# Patient Record
Sex: FEMALE | Race: WHITE | Employment: STUDENT | URBAN - METROPOLITAN AREA
[De-identification: names, ages, dates, MRNs, and addresses within clinical notes are randomized per-mention and may not be internally consistent; named-entity substitution may affect disease eponyms.]

---

## 2017-01-12 ENCOUNTER — TRANSFERRED RECORDS (OUTPATIENT)
Dept: FAMILY MEDICINE | Facility: CLINIC | Age: 32
End: 2017-01-12

## 2017-01-25 DIAGNOSIS — Z30.41 ENCOUNTER FOR SURVEILLANCE OF CONTRACEPTIVE PILLS: Primary | ICD-10-CM

## 2017-01-25 RX ORDER — DROSPIRENONE AND ETHINYL ESTRADIOL 0.03MG-3MG
KIT ORAL
Qty: 28 TABLET | Refills: 0 | Status: SHIPPED | OUTPATIENT
Start: 2017-01-25 | End: 2017-02-08

## 2017-01-25 NOTE — TELEPHONE ENCOUNTER
Pending Prescriptions:                       Disp   Refills    VANESSA 3-0.03 MG per tablet [Pharmacy Med *28 tab*0            Sig: TAKE 1 TABLET BY MOUTH DAILY    Do you want to refill for 30?   Pt does have a px scheduled to see you 2-8-2017  Please fax, or siny.  Arlette  [phone] (home)

## 2017-02-08 ENCOUNTER — OFFICE VISIT (OUTPATIENT)
Dept: FAMILY MEDICINE | Facility: CLINIC | Age: 32
End: 2017-02-08

## 2017-02-08 VITALS
BODY MASS INDEX: 33.37 KG/M2 | SYSTOLIC BLOOD PRESSURE: 116 MMHG | HEIGHT: 66 IN | RESPIRATION RATE: 16 BRPM | WEIGHT: 207.6 LBS | DIASTOLIC BLOOD PRESSURE: 82 MMHG | TEMPERATURE: 98.4 F | OXYGEN SATURATION: 98 % | HEART RATE: 90 BPM

## 2017-02-08 DIAGNOSIS — Z01.411 ENCOUNTER FOR GYNECOLOGICAL EXAMINATION WITH ABNORMAL FINDING: Primary | ICD-10-CM

## 2017-02-08 DIAGNOSIS — J45.990 EXERCISE INDUCED BRONCHOSPASM: ICD-10-CM

## 2017-02-08 DIAGNOSIS — J30.2 OTHER SEASONAL ALLERGIC RHINITIS: ICD-10-CM

## 2017-02-08 DIAGNOSIS — Z30.41 ENCOUNTER FOR SURVEILLANCE OF CONTRACEPTIVE PILLS: ICD-10-CM

## 2017-02-08 DIAGNOSIS — J45.20 ASTHMA, INTERMITTENT, UNCOMPLICATED: ICD-10-CM

## 2017-02-08 DIAGNOSIS — E66.09 OBESITY DUE TO EXCESS CALORIES, UNSPECIFIED OBESITY SEVERITY: ICD-10-CM

## 2017-02-08 LAB — HEMOGLOBIN: 14.1 G/DL (ref 11.7–15.7)

## 2017-02-08 PROCEDURE — 85018 HEMOGLOBIN: CPT | Performed by: FAMILY MEDICINE

## 2017-02-08 PROCEDURE — 36415 COLL VENOUS BLD VENIPUNCTURE: CPT | Performed by: FAMILY MEDICINE

## 2017-02-08 PROCEDURE — 99395 PREV VISIT EST AGE 18-39: CPT | Performed by: FAMILY MEDICINE

## 2017-02-08 RX ORDER — LEVALBUTEROL TARTRATE 45 UG/1
2 AEROSOL, METERED ORAL EVERY 4 HOURS PRN
Qty: 2 INHALER | Refills: 1 | Status: SHIPPED | OUTPATIENT
Start: 2017-02-08 | End: 2018-02-08

## 2017-02-08 RX ORDER — DROSPIRENONE AND ETHINYL ESTRADIOL 0.03MG-3MG
1 KIT ORAL DAILY
Qty: 84 TABLET | Refills: 3 | Status: SHIPPED | OUTPATIENT
Start: 2017-02-08 | End: 2018-01-27

## 2017-02-08 RX ORDER — MONTELUKAST SODIUM 10 MG/1
10 TABLET ORAL AT BEDTIME
Qty: 90 TABLET | Refills: 3 | Status: SHIPPED | OUTPATIENT
Start: 2017-02-08 | End: 2018-02-08

## 2017-02-08 RX ORDER — FLUTICASONE PROPIONATE 50 MCG
1-2 SPRAY, SUSPENSION (ML) NASAL DAILY
Qty: 16 G | Refills: 11 | Status: SHIPPED | OUTPATIENT
Start: 2017-02-08 | End: 2019-02-11

## 2017-02-08 RX ORDER — FEXOFENADINE HCL 180 MG/1
180 TABLET ORAL DAILY
Qty: 90 TABLET | Refills: 3 | Status: SHIPPED | OUTPATIENT
Start: 2017-02-08 | End: 2018-02-08

## 2017-02-08 NOTE — PATIENT INSTRUCTIONS
HGB 14.1 gm/dl    Total calcium intake of 1500 mgm/day, vitamin D 400-800IU/day and a regular weight bearing exercise program for prevention of osteoporosis is recommended treatment at this time.    Follow your asthma action plan  A low fat diet, regular aerobic exercise like walking 30 minutes daily and weight control is the treatment recommendations at this time     Sunscreen    Recheck 1 year.

## 2017-02-08 NOTE — NURSING NOTE
"Prudence Feliz is here for a CPX. Fasting: Yes.    Pre-visit Planning  Immunizations -up to date  Colonoscopy -NA  Mammogram -NA  Asthma test --is completed today  PHQ2 -is completed today  AVIVA 7 -NA    Vitals:  BP Cuff right  Arm with large Cuff  PULSE regular  207 lbs 9.6 oz and 5' 5.5\"  CLASSIFICATION OF OVERWEIGHT AND OBESITY BY BMI                        Obesity Class           BMI(kg/m2)  Underweight                                    < 18.5  Normal                                         18.5-24.9  Overweight                                     25.0-29.9  OBESITY                     I                  30.0-34.9                             II                 35.0-39.9  EXTREME OBESITY             III                >40      Patient's  BMI Body mass index is 34.01 kg/(m^2).  Http://hin.nhlbi.nih.gov/menuplanner/menu.cgi  Tobacco Use:  Questioned patient about current smoking habits.  Pt. has never smoked.  ETOH screening Questions:  1-How often do you have a drink containing alcohol?                             2 times per month(s)  2-How many drinks containing alcohol do you have on a typical day when you are         Drinking?                              3   3- How often do you have 5 or more drinks on one occasion?                              2 times per year    Have you ever:  @None of the patient's responses to the CAGE screening were positive / Negative CAGE score@    Roomed by: Ana Caceres CMA      "

## 2017-02-08 NOTE — PROGRESS NOTES
"SUBJECTIVE:  Prudence Feliz is an 31 year old G 0 P 0 woman who presents for   annual gyn exam. Patient's last menstrual period was 01/19/2017 (exact date). Periods are regular q 28-30 days, lasting   3 days. Dysmenorrhea:none. Cyclic symptoms   include none. No intermenstrual bleeding,   spotting, or discharge.    Current contraception: oral contraceptives  ANABELA exposure: no  History of abnormal Pap smear: No  Family history of uterine or ovarian cancer: No  Regular self breast exam: Yes  History of abnormal mammogram: No  Family history of breast cancer: No  History of abnormal lipids: No    Past Medical History   Diagnosis Date     Exercise induced bronchospasm        Family History   Problem Relation Age of Onset     Thyroid Disease Mother      C.A.D. No family hx of      Breast Cancer No family hx of      Cancer - colorectal No family hx of        Past Surgical History   Procedure Laterality Date     C nonspecific procedure  1994     urethroscopy       Current Outpatient Prescriptions   Medication     VANESSA 3-0.03 MG per tablet     fexofenadine (ALLEGRA) 180 MG tablet     montelukast (SINGULAIR) 10 MG tablet     fluticasone (FLONASE) 50 MCG/ACT nasal spray     XOPENEX HFA 45 MCG/ACT IN AERO     No current facility-administered medications for this visit.     Allergies   Allergen Reactions     Cefaclor      C-clor       Social History   Substance Use Topics     Smoking status: Never Smoker      Smokeless tobacco: Never Used     Alcohol Use: 0.5 oz/week     1 drink(s) per week      Comment: once a week       Review Of Systems  Ears/Nose/Throat: allergies year around  Respiratory: asthma well controlled  Cardiovascular: negative  Gastrointestinal: negative  Genitourinary: negative    OBJECTIVE:  /82 mmHg  Pulse 90  Temp(Src) 98.4  F (36.9  C) (Oral)  Ht 1.664 m (5' 5.5\")  Wt 94.167 kg (207 lb 9.6 oz)  BMI 34.01 kg/m2  SpO2 98%  LMP 01/19/2017 (Exact Date)  General appearance: healthy, alert, " no distress, cooperative and smiling  Skin: Skin color, texture, turgor normal. No rashes or lesions., positives: fair skin with genral distibution of benign nevi  Ears: negative  Nose/Sinuses: Nares normal. Septum midline. Mucosa normal. No drainage or sinus tenderness.  Oropharynx: Lips, mucosa, and tongue normal. Teeth and gums normal.  Neck: Neck supple. No adenopathy. Thyroid symmetric, normal size,, Carotids without bruits.  Lungs: negative, Percussion normal. Good diaphragmatic excursion. Lungs clear  Heart: negative, PMI normal. No lifts, heaves, or thrills. RRR. No murmurs, clicks gallops or rub  Breasts: Inspection negative. No nipple discharge or bleeding. No masses.  Abdomen: Abdomen soft, non-tender. BS normal. No masses, organomegaly, positive findings: obese  Pelvic: External genitalia and vagina normal. Bimanual and rectovaginal normal.  BMI : Body mass index is 34.01 kg/(m^2).    ASSESSMENT:(Z01.411) Encounter for gynecological examination with abnormal finding  (primary encounter diagnosis)  Plan: CL AFF HEMOGLOBIN (BFP), VENOUS COLLECTION        Total calcium intake of 1500 mgm/day, vitamin D 400-800IU/day and a regular weight bearing exercise program for prevention of osteoporosis is recommended treatment at this time.      (J45.20) Asthma, intermittent, uncomplicated  Comment: follow Asthma action plan  Plan: fexofenadine (ALLEGRA) 180 MG tablet,         montelukast (SINGULAIR) 10 MG tablet,         fluticasone (FLONASE) 50 MCG/ACT spray,         levalbuterol (XOPENEX HFA) 45 MCG/ACT Inhaler        refilled    (J30.2) Other seasonal allergic rhinitis  Plan: fexofenadine (ALLEGRA) 180 MG tablet,         montelukast (SINGULAIR) 10 MG tablet,         fluticasone (FLONASE) 50 MCG/ACT spray        Daily care    (J45.990) Exercise induced bronchospasm  Plan: montelukast (SINGULAIR) 10 MG tablet,         levalbuterol (XOPENEX HFA) 45 MCG/ACT Inhaler        I have reviewed the patient's medical history  in detail and updated the computerized patient record.      (E66.09) Obesity due to excess calories, unspecified obesity severity  Plan: A low fat diet, regular aerobic exercise like walking 30 minutes daily and weight control is the treatment recommendations at this time      (Z30.41) Encounter for surveillance of contraceptive pills  Plan: drospirenone-ethinyl estradiol (VANESSA) 3-0.03         MG per tablet        Refilled/ recheck 1 year        Dx:  1)  Pap smear  2)  Mammography, lipids at appropriate intervals      PE:  Reviewed health maintenance including diet, regular exercise   and periodic exams.

## 2017-02-08 NOTE — Clinical Note
My Asthma Action Plan  Name: Prudence Feliz   YOB: 1985  Date: 2/8/2017   My doctor: Lanny Gaines   My clinic: [facility]   [address]  [address]  [facility] [address] [phone] My Control Medicine: Allegra, Flonase and Singulair        Dose: daily  My Rescue Medicine: Xopenex        Dose: 1-2 puffs   My Asthma Severity: intermittent  Avoid your asthma triggers: upper respiratory infections, pollens and exercise or sports        GREEN ZONE   Good Control    I feel good    No cough or wheeze    Can work, sleep and play without asthma symptoms       Take your asthma control medicine every day.     1. If exercise triggers your asthma, take your rescue medication    15 minutes before exercise or sports, and    During exercise if you have asthma symptoms  2. Spacer to use with inhaler: If you have a spacer, make sure to use it with your inhaler             YELLOW ZONE Getting Worse  I have ANY of these:    I do not feel good    Cough or wheeze    Chest feels tight    Wake up at night   1. Keep taking your Green Zone medications  2. Start taking your rescue medicine:    every 20 minutes for up to 1 hour. Then every 4 hours for 24-48 hours.  3. If you stay in the Yellow Zone for more than 12-24 hours, contact your doctor.  4. If you do not return to the Green Zone in 12-24 hours or you get worse, start taking your oral steroid medicine if prescribed by your provider.           RED ZONE Medical Alert - Get Help  I have ANY of these:    I feel awful    Medicine is not helping    Breathing getting harder    Trouble walking or talking    Nose opens wide to breathe       1. Take your rescue medicine NOW  2. If your provider has prescribed an oral steroid medicine, start taking it NOW  3. Call your doctor NOW  4. If you are still in the Red Zone after 20 minutes and you have not reached your doctor:    Take your rescue medicine again and    Call  911 or go to the emergency room right away    See your regular doctor within 2 weeks of an Emergency Room or Urgent Care visit for follow-up treatment.        The above medication may be given at school or day care?: Yes and N/A (Adult Patient)  Child can carry and use inhaler(s) at school with approval of school nurse?: Yes and N/A (Adult Patient)    Electronically signed by: Lanny Gaines, February 8, 2017    Annual Reminders:  Meet with Asthma Educator,  Flu Shot in the Fall, consider Pneumonia Vaccination for patients with asthma (aged 19 and older).    Pharmacy:    [facility] [place]  [facility] [address] [facility] [address] AT [facility] [address]  [facility] [address] [facility] [address] AT [facility] [address] & [facility]                      Asthma Triggers  How To Control Things That Make Your Asthma Worse    Triggers are things that make your asthma worse.  Look at the list below to help you find your triggers and what you can do about them.  You can help prevent asthma flare-ups by staying away from your triggers.      Trigger                                                          What you can do   Cigarette Smoke  Tobacco smoke can make asthma worse. Do not allow smoking in your home, car or around you.  Be sure no one smokes at a child s day care or school.  If you smoke, ask your health care provider for ways to help you quick.  Ask family members to quit too.  Ask your health care provider for a referral to Quit plan to help you quit smoking, or call 0-842-867-PLAN.     Colds, Flu, Bronchitis  These are common triggers of asthma. Wash your hands often.  Don t touch your eyes, nose or mouth.  Get a flu shot every year.     Dust Mites  These are tiny bugs that live in cloth or carpet. They are too small to see. Wash sheets and blankets in hot water every week.   Encase pillows and mattress in dust mite proof covers.  Avoid having carpet if you  can. If you have carpet, vacuum weekly.   Use a dust mask and HEPA vacuum.   Pollen and Outdoor Mold  Some people are allergic to trees, grass, or weed pollen, or molds. Try to keep your windows closed.  Limit time out doors when pollen count is high.   Ask you health care provider about taking medicine during allergy season.     Animal Dander  Some people are allergic to skin flakes, urine or saliva from pets with fur or feathers. Keep pets with fur or feathers out of your home.    If you can t keep the pet outdoors, then keep the pet out of your bedroom.  Keep the bedroom door closed.  Keep pets off cloth furniture and away from stuffed toys.     Mice, Rats, and Cockroaches  Some people are allergic to the waste from these pests.   Cover food and garbage.  Clean up spills and food crumbs.  Store grease in the refrigerator.   Keep food out of the bedroom.   Indoor Mold  This can be a trigger if your home has high moisture Fix leaking faucets, pipes, or other sources of water.   Clean moldy surfaces.  Dehumidify basement if it is damp and smelly.   Smoke, Strong Odors, and Sprays  These can reduce air quality. Stay away from strong odors and sprays, such as perfume, powder, hair spray, paints, smoke incense, paint, cleaning products, candles and new carpet.   Exercise or Sports  Some people with asthma have this trigger. Be active!  Ask you doctor about taking medicine before sports or exercise to prevent symptoms.    Warm up for 5-10 minutes before and after sports or exercise.     Other Triggers of Asthma  Cold air:  Cover your nose and mouth with a scarf.  Sometimes laughing or crying can be a trigger.  Some medicines and food can trigger asthma.

## 2017-02-08 NOTE — MR AVS SNAPSHOT
After Visit Summary   2/8/2017    Prudence Feliz    MRN: [id number]           Patient Information     Date Of Birth          1985        Visit Information        Provider Department      2/8/2017 10:15 AM Lanny Gaines MD [facility]        Today's Diagnoses     Encounter for gynecological examination with abnormal finding    -  1     Asthma, intermittent, uncomplicated         Other seasonal allergic rhinitis         Exercise induced bronchospasm         Encounter for surveillance of contraceptive pills           Care Instructions    HGB 14.1 gm/dl    Total calcium intake of 1500 mgm/day, vitamin D 400-800IU/day and a regular weight bearing exercise program for prevention of osteoporosis is recommended treatment at this time.    Follow your asthma action plan  A low fat diet, regular aerobic exercise like walking 30 minutes daily and weight control is the treatment recommendations at this time     Sunscreen          Follow-ups after your visit        Who to contact     If you have questions or need follow up information about today's clinic visit or your schedule please contact [facility] directly at [phone].  Normal or non-critical lab and imaging results will be communicated to you by Faveshart, letter or phone within 4 business days after the clinic has received the results. If you do not hear from us within 7 days, please contact the clinic through [employer] or phone. If you have a critical or abnormal lab result, we will notify you by phone as soon as possible.  Submit refill requests through FiPath or call your pharmacy and they will forward the refill request to us. Please allow 3 business days for your refill to be completed.          Additional Information About Your Visit        Faveshart Information     FiPath gives you secure access to your electronic health record. If you see a primary care provider, you can also send  "messages to your care team and make appointments. If you have questions, please call your primary care clinic.  If you do not have a primary care provider, please call [phone] and they will assist you.        Care EveryWhere ID     This is your Care EveryWhere ID. This could be used by other organizations to access your [place] medical records  [id number]        Your Vitals Were     Pulse Temperature Respirations    90 98.4  F (36.9  C) (Oral) 16    Height BMI (Body Mass Index) Pulse Oximetry    1.664 m (5' 5.5\") 34.01 kg/m2 98%    Last Period          01/19/2017 (Exact Date)         Blood Pressure from Last 3 Encounters:   02/08/17 116/82   02/03/16 110/70   03/02/15 120/80    Weight from Last 3 Encounters:   02/08/17 94.167 kg (207 lb 9.6 oz)   02/03/16 93.895 kg (207 lb)   03/02/15 89.812 kg (198 lb)              We Performed the Following     Asthma Action Plan (AAP)     CL AFF HEMOGLOBIN (BFP)     VENOUS COLLECTION          Today's Medication Changes          These changes are accurate as of: 2/8/17 11:24 AM.  If you have any questions, ask your nurse or doctor.               These medicines have changed or have updated prescriptions.        Dose/Directions    drospirenone-ethinyl estradiol 3-0.03 MG per tablet   Commonly known as:  VANESSA   This may have changed:  See the new instructions.   Used for:  Encounter for surveillance of contraceptive pills   Changed by:  Lanny Gaines MD        Dose:  1 tablet   Take 1 tablet by mouth daily   Quantity:  84 tablet   Refills:  3       levalbuterol 45 MCG/ACT Inhaler   Commonly known as:  XOPENEX HFA   This may have changed:  See the new instructions.   Used for:  Exercise induced bronchospasm, Asthma, intermittent, uncomplicated   Changed by:  Lanny Gaines MD        Dose:  2 puff   Inhale 2 puffs into the lungs every 4 hours as needed for shortness of breath / dyspnea or wheezing   Quantity:  2 Inhaler   Refills:  1            Where to get your " medicines      These medications were sent to [id number] - [place], [place] - [address], [place] [address]    Hours:  24-hours Phone:  [phone]    - drospirenone-ethinyl estradiol 3-0.03 MG per tablet  - fexofenadine 180 MG tablet  - fluticasone 50 MCG/ACT spray  - levalbuterol 45 MCG/ACT Inhaler  - montelukast 10 MG tablet             Primary Care Provider Office Phone # Fax #    Lanny Gaines [phone]       [facility] [address] [facility]  [address]  [facility] [address]        Thank you!     Thank you for choosing [facility]  for your care. Our goal is always to provide you with excellent care. Hearing back from our patients is one way we can continue to improve our services. Please take a few minutes to complete the written survey that you may receive in the mail after your visit with us. Thank you!             Your Updated Medication List - Protect others around you: Learn how to safely use, store and throw away your medicines at www.disposemymeds.org.          This list is accurate as of: 2/8/17 11:24 AM.  Always use your most recent med list.                   Brand Name Dispense Instructions for use    drospirenone-ethinyl estradiol 3-0.03 MG per tablet    VANESSA    84 tablet    Take 1 tablet by mouth daily       fexofenadine 180 MG tablet    ALLEGRA    90 tablet    Take 1 tablet (180 mg) by mouth daily       fluticasone 50 MCG/ACT spray    FLONASE    16 g    Spray 1-2 sprays into both nostrils daily       levalbuterol 45 MCG/ACT Inhaler    XOPENEX HFA    2 Inhaler    Inhale 2 puffs into the lungs every 4 hours as needed for shortness of breath / dyspnea or wheezing       montelukast 10 MG tablet    SINGULAIR    90 tablet    Take 1 tablet (10 mg) by mouth At Bedtime

## 2017-02-09 ASSESSMENT — ASTHMA QUESTIONNAIRES: ACT_TOTALSCORE: 16

## 2017-06-05 ENCOUNTER — OFFICE VISIT (OUTPATIENT)
Dept: FAMILY MEDICINE | Facility: CLINIC | Age: 32
End: 2017-06-05

## 2017-06-05 VITALS
DIASTOLIC BLOOD PRESSURE: 80 MMHG | OXYGEN SATURATION: 96 % | TEMPERATURE: 98.6 F | BODY MASS INDEX: 32.61 KG/M2 | SYSTOLIC BLOOD PRESSURE: 110 MMHG | WEIGHT: 199 LBS | HEART RATE: 117 BPM

## 2017-06-05 DIAGNOSIS — J02.0 STREPTOCOCCAL SORE THROAT: Primary | ICD-10-CM

## 2017-06-05 LAB — S PYO AG THROAT QL IA.RAPID: ABNORMAL

## 2017-06-05 PROCEDURE — 87880 STREP A ASSAY W/OPTIC: CPT | Performed by: PHYSICIAN ASSISTANT

## 2017-06-05 PROCEDURE — 99213 OFFICE O/P EST LOW 20 MIN: CPT | Performed by: PHYSICIAN ASSISTANT

## 2017-06-05 RX ORDER — AMOXICILLIN 875 MG
875 TABLET ORAL 2 TIMES DAILY
Qty: 20 TABLET | Refills: 0 | Status: SHIPPED | OUTPATIENT
Start: 2017-06-05 | End: 2017-09-25

## 2017-06-05 NOTE — NURSING NOTE
Prudence is here for possible strep    Pre-Visit Screening :  Immunizations : up to date  Colonoscopy : NA  Mammogram : NA  Asthma Action Test/Plan : NA  PHQ9/GAD7 :  NA  Pulse - regular  My Chart - accepts    CLASSIFICATION OF OVERWEIGHT AND OBESITY BY BMI                         Obesity Class           BMI(kg/m2)  Underweight                                    < 18.5  Normal                                         18.5-24.9  Overweight                                     25.0-29.9  OBESITY                     I                  30.0-34.9                              II                 35.0-39.9  EXTREME OBESITY             III                >40                             Patient's  BMI Body mass index is 32.61 kg/(m^2).  http://hin.nhlbi.nih.gov/menuplanner/menu.cgi  Questioned patient about current smoking habits.  Pt. has never smoked.

## 2017-06-05 NOTE — MR AVS SNAPSHOT
After Visit Summary   6/5/2017    Prudence Feliz    MRN: [id number]           Patient Information     Date Of Birth          1985        Visit Information        Provider Department      6/5/2017 11:45 AM Constance Desai PA [facility]        Today's Diagnoses     Streptococcal sore throat    -  1       Follow-ups after your visit        Who to contact     If you have questions or need follow up information about today's clinic visit or your schedule please contact [facility] directly at [phone].  Normal or non-critical lab and imaging results will be communicated to you by SuperDerivativeshart, letter or phone within 4 business days after the clinic has received the results. If you do not hear from us within 7 days, please contact the clinic through SuperDerivativeshart or phone. If you have a critical or abnormal lab result, we will notify you by phone as soon as possible.  Submit refill requests through OpenDrive or call your pharmacy and they will forward the refill request to us. Please allow 3 business days for your refill to be completed.          Additional Information About Your Visit        MyChart Information     OpenDrive gives you secure access to your electronic health record. If you see a primary care provider, you can also send messages to your care team and make appointments. If you have questions, please call your primary care clinic.  If you do not have a primary care provider, please call [phone] and they will assist you.        Care EveryWhere ID     This is your Care EveryWhere ID. This could be used by other organizations to access your [place] medical records  [id number]        Your Vitals Were     Pulse Temperature Last Period Pulse Oximetry Breastfeeding? BMI (Body Mass Index)    117 98.6  F (37  C) (Oral) 05/15/2017 96% No 32.61 kg/m2       Blood Pressure from Last 3 Encounters:   06/05/17 110/80   02/08/17 116/82    02/03/16 110/70    Weight from Last 3 Encounters:   06/05/17 90.3 kg (199 lb)   02/08/17 94.2 kg (207 lb 9.6 oz)   02/03/16 93.9 kg (207 lb)              We Performed the Following     Rapid strep screen          Today's Medication Changes          These changes are accurate as of: 6/5/17 12:28 PM.  If you have any questions, ask your nurse or doctor.               Start taking these medicines.        Dose/Directions    amoxicillin 875 MG tablet   Commonly known as:  AMOXIL   Used for:  Streptococcal sore throat   Started by:  oCnstance Desai PA        Dose:  875 mg   Take 1 tablet (875 mg) by mouth 2 times daily   Quantity:  20 tablet   Refills:  0            Where to get your medicines      These medications were sent to [facility] [address] [address] AT [facility] [address] & [place] [address], [facility] [address]    Hours:  24-hours Phone:  [phone]     amoxicillin 875 MG tablet                Primary Care Provider Office Phone # Fax #    Lanny Gaines [phone]       [place] [address] [address]        Thank you!     Thank you for choosing [facility]  for your care. Our goal is always to provide you with excellent care. Hearing back from our patients is one way we can continue to improve our services. Please take a few minutes to complete the written survey that you may receive in the mail after your visit with us. Thank you!             Your Updated Medication List - Protect others around you: Learn how to safely use, store and throw away your medicines at www.disposemymeds.org.          This list is accurate as of: 6/5/17 12:28 PM.  Always use your most recent med list.                   Brand Name Dispense Instructions for use    amoxicillin 875 MG tablet    AMOXIL    20 tablet    Take 1 tablet (875 mg) by mouth 2 times daily       drospirenone-ethinyl  estradiol 3-0.03 MG per tablet    VANESSA    84 tablet    Take 1 tablet by mouth daily       fexofenadine 180 MG tablet    ALLEGRA    90 tablet    Take 1 tablet (180 mg) by mouth daily       fluticasone 50 MCG/ACT spray    FLONASE    16 g    Spray 1-2 sprays into both nostrils daily       levalbuterol 45 MCG/ACT Inhaler    XOPENEX HFA    2 Inhaler    Inhale 2 puffs into the lungs every 4 hours as needed for shortness of breath / dyspnea or wheezing       montelukast 10 MG tablet    SINGULAIR    90 tablet    Take 1 tablet (10 mg) by mouth At Bedtime

## 2017-06-05 NOTE — PROGRESS NOTES
SUBJECTIVE:                                                    Prudence Feliz is a 32 year old female who presents to clinic today for the following health issues:    Prudence  is here today because of: Sore Throat    Onset of symptoms was 2 days ago.    The patient has had symptoms of: Sore throat, body aches, fevers/chills, dry mouth, sore neck.     Patient denies nausea, vomiting and diarrhea    Course of illness is same.    Patient admits to exposure to illness at home or work/school.     Treatment measures tried include:   Tylenol severe flu    Patient is not a smoker    Patient does have a history of asthma    Use to see Dr. Wyman - within last 2 years   Asmanex previously - stopped 2 years ago.       ROS:  CV: NEGATIVE for chest pain or palpitations   GI: NEGATIVE for nausea, vomiting, abdominal pain, diarrhea or constipation  : negative for dysuria, hematuria        BP Readings from Last 3 Encounters:   06/05/17 110/80   02/08/17 116/82   02/03/16 110/70    Wt Readings from Last 3 Encounters:   06/05/17 90.3 kg (199 lb)   02/08/17 94.2 kg (207 lb 9.6 oz)   02/03/16 93.9 kg (207 lb)                  Patient Active Problem List   Diagnosis     Contact dermatitis and other eczema, due to unspecified cause     Obesity     Health Care Home     ACP (advance care planning)     Asthma, intermittent, uncomplicated     Other seasonal allergic rhinitis     Past Surgical History:   Procedure Laterality Date     C NONSPECIFIC PROCEDURE  1994    urethroscopy       Social History   Substance Use Topics     Smoking status: Never Smoker     Smokeless tobacco: Never Used     Alcohol use 0.5 oz/week     1 drink(s) per week      Comment: once a week     Family History   Problem Relation Age of Onset     Thyroid Disease Mother      C.A.D. No family hx of      Breast Cancer No family hx of      Cancer - colorectal No family hx of          Current Outpatient Prescriptions   Medication Sig Dispense Refill      drospirenone-ethinyl estradiol (VANESSA) 3-0.03 MG per tablet Take 1 tablet by mouth daily 84 tablet 3     montelukast (SINGULAIR) 10 MG tablet Take 1 tablet (10 mg) by mouth At Bedtime 90 tablet 3     fluticasone (FLONASE) 50 MCG/ACT spray Spray 1-2 sprays into both nostrils daily 16 g 11     fexofenadine (ALLEGRA) 180 MG tablet Take 1 tablet (180 mg) by mouth daily 90 tablet 3     levalbuterol (XOPENEX HFA) 45 MCG/ACT Inhaler Inhale 2 puffs into the lungs every 4 hours as needed for shortness of breath / dyspnea or wheezing 2 Inhaler 1     Allergies   Allergen Reactions     Cefaclor      C-clor       OBJECTIVE:                                                    /80 (BP Location: Right arm, Patient Position: Chair, Cuff Size: Adult Large)  Pulse 117  Temp 98.6  F (37  C) (Oral)  Wt 90.3 kg (199 lb)  LMP 05/15/2017  SpO2 96%  Breastfeeding? No  BMI 32.61 kg/m2   Body mass index is 32.61 kg/(m^2).     GENERAL: healthy, alert and no distress  EYES:Lids and Conjunctival normal, no discharge present bilaterally  EARS:   Right: CANAL and TM is normal: no effusions, no erythema, and normal landmarks  Left: CANAL and TM is normal: no effusions, no erythema, and normal landmarks  NOSE: normal  OROPHARYNX: erythematous, no exudates, no tonsillar hypertrophy  NECK: normal, supple and no adenopathy  HEART: regular rate and rhythm; no murmurs, clicks, or gallops  LUNGS: CTA bilaterally  SKIN:Warm, Dry and No Rash           ASSESSMENT/PLAN:                                                          P:       ICD-10-CM    1. Streptococcal sore throat J02.0 Rapid strep screen     amoxicillin (AMOXIL) 875 MG tablet         Symptomatic cares advised including Increase fluids, rest, acetominophen or ibuprofen prn if not contraindicated.     If applicable advised the following:  Sore throat:  sugar free throat lozenges, sprays, Chloraseptic lozenges, salt water gargles    If an antibiotic was prescribed, AE were discussed  including GI upset. Advised either yogurt or probiotic at lunchtime daily.     Backup contraception for 1 cycle        The patient is to RTC prn if these symptoms worsen or fail to improve as anticipated.       Constance Desai PA-C  6/5/2017

## 2017-06-06 ASSESSMENT — ASTHMA QUESTIONNAIRES: ACT_TOTALSCORE: 24

## 2017-09-25 ENCOUNTER — OFFICE VISIT (OUTPATIENT)
Dept: FAMILY MEDICINE | Facility: CLINIC | Age: 32
End: 2017-09-25

## 2017-09-25 VITALS
RESPIRATION RATE: 16 BRPM | WEIGHT: 205 LBS | HEIGHT: 65 IN | SYSTOLIC BLOOD PRESSURE: 110 MMHG | DIASTOLIC BLOOD PRESSURE: 70 MMHG | HEART RATE: 103 BPM | OXYGEN SATURATION: 97 % | BODY MASS INDEX: 34.16 KG/M2 | TEMPERATURE: 98.7 F

## 2017-09-25 DIAGNOSIS — R07.0 THROAT PAIN: ICD-10-CM

## 2017-09-25 DIAGNOSIS — J03.90 TONSILLITIS: Primary | ICD-10-CM

## 2017-09-25 LAB — S PYO AG THROAT QL IA.RAPID: NORMAL

## 2017-09-25 PROCEDURE — 99213 OFFICE O/P EST LOW 20 MIN: CPT | Performed by: FAMILY MEDICINE

## 2017-09-25 PROCEDURE — 87070 CULTURE OTHR SPECIMN AEROBIC: CPT | Performed by: FAMILY MEDICINE

## 2017-09-25 PROCEDURE — 87880 STREP A ASSAY W/OPTIC: CPT | Performed by: FAMILY MEDICINE

## 2017-09-25 RX ORDER — AZITHROMYCIN 250 MG/1
TABLET, FILM COATED ORAL
Qty: 6 TABLET | Refills: 0 | Status: SHIPPED | OUTPATIENT
Start: 2017-09-25 | End: 2018-02-08

## 2017-09-25 NOTE — NURSING NOTE
Patient is here for a ST that started on Sat.  Pre-Visit Screening :  Immunizations : up to date  Asthma Action Plan/Test : na  PHQ9/GAD7 : na  Pulse - regular  My Chart - accepts    CLASSIFICATION OF OVERWEIGHT AND OBESITY BY BMI                         Obesity Class           BMI(kg/m2)  Underweight                                    < 18.5  Normal                                         18.5-24.9  Overweight                                     25.0-29.9  OBESITY                     I                  30.0-34.9                              II                 35.0-39.9  EXTREME OBESITY             III                >40                             Patient's  BMI Body mass index is 33.85 kg/(m^2).  http://hin.nhlbi.nih.gov/menuplanner/menu.cgi  Questioned patient about current smoking habits.  Pt. has never smoked.

## 2017-09-25 NOTE — MR AVS SNAPSHOT
After Visit Summary   9/25/2017    Prudence Feliz    MRN: [id number]           Patient Information     Date Of Birth          1985        Visit Information        Provider Department      9/25/2017 11:00 AM Lanny Gaines MD [facility]        Today's Diagnoses     Tonsillitis    -  1    Throat pain          Care Instructions    Rx:  1)  Symptomatic treatment with fluids, vaporizer, acetaminophen.  2)  Recheck as needed for persistence, worsening, appearance of new   symptoms.  3)  Azithromycin            Follow-ups after your visit        Follow-up notes from your care team     Return if symptoms worsen or fail to improve.      Who to contact     If you have questions or need follow up information about today's clinic visit or your schedule please contact [facility] directly at [phone].  Normal or non-critical lab and imaging results will be communicated to you by [employer], letter or phone within 4 business days after the clinic has received the results. If you do not hear from us within 7 days, please contact the clinic through [employer] or phone. If you have a critical or abnormal lab result, we will notify you by phone as soon as possible.  Submit refill requests through [employer] or call your pharmacy and they will forward the refill request to us. Please allow 3 business days for your refill to be completed.          Additional Information About Your Visit        MyChart Information     [employer] gives you secure access to your electronic health record. If you see a primary care provider, you can also send messages to your care team and make appointments. If you have questions, please call your primary care clinic.  If you do not have a primary care provider, please call [phone] and they will assist you.        Care EveryWhere ID     This is your Care EveryWhere ID. This could be used by other organizations to access your [place]  "medical records  [id number]        Your Vitals Were     Pulse Temperature Respirations Height Last Period Pulse Oximetry    103 98.7  F (37.1  C) (Oral) 16 1.657 m (5' 5.25\") 09/11/2017 97%    BMI (Body Mass Index)                   33.85 kg/m2            Blood Pressure from Last 3 Encounters:   09/25/17 110/70   06/05/17 110/80   02/08/17 116/82    Weight from Last 3 Encounters:   09/25/17 93 kg (205 lb)   06/05/17 90.3 kg (199 lb)   02/08/17 94.2 kg (207 lb 9.6 oz)              We Performed the Following     Rapid strep screen     THROAT CULTURE (BFP)          Today's Medication Changes          These changes are accurate as of: 9/25/17 11:49 AM.  If you have any questions, ask your nurse or doctor.               Start taking these medicines.        Dose/Directions    azithromycin 250 MG tablet   Commonly known as:  ZITHROMAX   Used for:  Tonsillitis   Started by:  Lanny Gaines MD        Two tablets first day, then one tablet daily for four days.   Quantity:  6 tablet   Refills:  0            Where to get your medicines      These medications were sent to [facility] [address] [place] [address] AT [place] [address] & [address] [address]    Hours:  24-hours Phone:  [phone]     azithromycin 250 MG tablet                Primary Care Provider Office Phone # Fax #    Lanny Gaines [phone]       [facility] [address] [address]        Equal Access to Services     [facility]: Hadii blayne parks hadasho Soforeign, waaxda luqadaha, qaybta kaalmada adeegzena, dilcia varela. So [facility] [phone].    ATENCIÓN: Si habla español, tiene a yoon disposición servicios gratuitos de asistencia lingüística. Llame al [phone].    We comply with applicable federal civil rights laws and [place] laws. We do not discriminate on the basis of race, color, national origin, age, disability sex, sexual " orientation or gender identity.            Thank you!     Thank you for choosing [facility]  for your care. Our goal is always to provide you with excellent care. Hearing back from our patients is one way we can continue to improve our services. Please take a few minutes to complete the written survey that you may receive in the mail after your visit with us. Thank you!             Your Updated Medication List - Protect others around you: Learn how to safely use, store and throw away your medicines at www.disposemymeds.org.          This list is accurate as of: 9/25/17 11:49 AM.  Always use your most recent med list.                   Brand Name Dispense Instructions for use Diagnosis    azithromycin 250 MG tablet    ZITHROMAX    6 tablet    Two tablets first day, then one tablet daily for four days.    Tonsillitis       drospirenone-ethinyl estradiol 3-0.03 MG per tablet    VANESSA    84 tablet    Take 1 tablet by mouth daily    Encounter for surveillance of contraceptive pills       fexofenadine 180 MG tablet    ALLEGRA    90 tablet    Take 1 tablet (180 mg) by mouth daily    Other seasonal allergic rhinitis, Asthma, intermittent, uncomplicated       fluticasone 50 MCG/ACT spray    FLONASE    16 g    Spray 1-2 sprays into both nostrils daily    Other seasonal allergic rhinitis, Asthma, intermittent, uncomplicated       levalbuterol 45 MCG/ACT Inhaler    XOPENEX HFA    2 Inhaler    Inhale 2 puffs into the lungs every 4 hours as needed for shortness of breath / dyspnea or wheezing    Exercise induced bronchospasm, Asthma, intermittent, uncomplicated       montelukast 10 MG tablet    SINGULAIR    90 tablet    Take 1 tablet (10 mg) by mouth At Bedtime    Other seasonal allergic rhinitis, Asthma, intermittent, uncomplicated, Exercise induced bronchospasm

## 2017-09-25 NOTE — PATIENT INSTRUCTIONS
Rx:  1)  Symptomatic treatment with fluids, vaporizer, acetaminophen.  2)  Recheck as needed for persistence, worsening, appearance of new   symptoms.  3)  Azithromycin

## 2017-09-25 NOTE — PROGRESS NOTES
"SUBJECTIVE:  Prudence Feliz is an 32 year old female who presents for evaluation and treatment   of sore throat. Symptoms include sore throat and swollen glands. Onset 2   days, gradually worsening since that time. Known Strep exposure:   school exposure.    Current Outpatient Prescriptions   Medication     drospirenone-ethinyl estradiol (VANESSA) 3-0.03 MG per tablet     fexofenadine (ALLEGRA) 180 MG tablet     montelukast (SINGULAIR) 10 MG tablet     fluticasone (FLONASE) 50 MCG/ACT spray     levalbuterol (XOPENEX HFA) 45 MCG/ACT Inhaler     No current facility-administered medications for this visit.      Allergies   Allergen Reactions     Cefaclor      C-clor       Social History   Substance Use Topics     Smoking status: Never Smoker     Smokeless tobacco: Never Used     Alcohol use 0.5 oz/week     1 drink(s) per week      Comment: once a week       OBJECTIVE:  /70 (BP Location: Right arm, Cuff Size: Adult Large)  Pulse 103  Temp 98.7  F (37.1  C) (Oral)  Ht 5' 5.25\" (1.657 m)  Wt 205 lb (93 kg)  LMP 09/11/2017  SpO2 97%  BMI 33.85 kg/m2  General appearance: healthy, alert, no distress, cooperative, smiling and over weight  Ears: R TM - normal: no effusions, no erythema, and normal landmarks, L TM - normal: no effusions, no erythema, and normal landmarks  Nose: normal  Oropharynx: tonsillar hypertrophy, asymmetric left 1+, exudates present, throat culture taken and rapid strep done  Neck: normal, supple and no adenopathy  Lungs: normal and clear to auscultation  Heart: regular rate and rhythm and no murmurs, clicks, or gallops    ASSESSMENT:  Acute pharyngitis - r/o Strep      RSS negative; await throat culture results.    Dx:  Non-strep pharyngitis/ tonsillitis    Rx:  1)  Symptomatic treatment with fluids, vaporizer, acetaminophen.  2)  Recheck as needed for persistence, worsening, appearance of new   symptoms.  3)  Azithromycin    PLAN:  Discussed possible etiologies of symptoms and need " for antibiotic   treatment if Strep found.

## 2017-09-27 LAB — THROAT CULTURE: NORMAL

## 2017-10-09 ENCOUNTER — ALLIED HEALTH/NURSE VISIT (OUTPATIENT)
Dept: FAMILY MEDICINE | Facility: CLINIC | Age: 32
End: 2017-10-09

## 2017-10-09 DIAGNOSIS — Z23 NEED FOR VACCINATION: Primary | ICD-10-CM

## 2017-10-09 PROCEDURE — 90472 IMMUNIZATION ADMIN EACH ADD: CPT | Performed by: FAMILY MEDICINE

## 2017-10-09 PROCEDURE — 90471 IMMUNIZATION ADMIN: CPT | Performed by: FAMILY MEDICINE

## 2017-10-09 PROCEDURE — 90715 TDAP VACCINE 7 YRS/> IM: CPT | Performed by: FAMILY MEDICINE

## 2017-10-09 PROCEDURE — 90686 IIV4 VACC NO PRSV 0.5 ML IM: CPT | Performed by: FAMILY MEDICINE

## 2018-01-27 DIAGNOSIS — Z30.41 ENCOUNTER FOR SURVEILLANCE OF CONTRACEPTIVE PILLS: ICD-10-CM

## 2018-01-29 RX ORDER — DROSPIRENONE AND ETHINYL ESTRADIOL 0.03MG-3MG
KIT ORAL
Qty: 28 TABLET | Refills: 0 | Status: SHIPPED | OUTPATIENT
Start: 2018-01-29 | End: 2018-02-08

## 2018-01-29 NOTE — TELEPHONE ENCOUNTER
Pending Prescriptions:                       Disp   Refills    drospirenone-ethinyl estradiol (DAVID) 3*28 tab*0            Sig: TAKE 1 TABLET BY MOUTH DAILY    Pt due for a yearly  fasting px  Please fax 30 and send to [facility]  [phone] (home)

## 2018-02-08 ENCOUNTER — OFFICE VISIT (OUTPATIENT)
Dept: FAMILY MEDICINE | Facility: CLINIC | Age: 33
End: 2018-02-08

## 2018-02-08 VITALS
DIASTOLIC BLOOD PRESSURE: 86 MMHG | OXYGEN SATURATION: 98 % | SYSTOLIC BLOOD PRESSURE: 132 MMHG | HEIGHT: 67 IN | WEIGHT: 212.6 LBS | BODY MASS INDEX: 33.37 KG/M2 | HEART RATE: 80 BPM | TEMPERATURE: 98.4 F | RESPIRATION RATE: 16 BRPM

## 2018-02-08 DIAGNOSIS — Z30.41 ENCOUNTER FOR SURVEILLANCE OF CONTRACEPTIVE PILLS: ICD-10-CM

## 2018-02-08 DIAGNOSIS — Z01.419 ENCOUNTER FOR GYNECOLOGICAL EXAMINATION WITHOUT ABNORMAL FINDING: Primary | ICD-10-CM

## 2018-02-08 DIAGNOSIS — E66.811 CLASS 1 OBESITY DUE TO EXCESS CALORIES WITHOUT SERIOUS COMORBIDITY IN ADULT, UNSPECIFIED BMI: ICD-10-CM

## 2018-02-08 DIAGNOSIS — J45.20 ASTHMA, INTERMITTENT, UNCOMPLICATED: ICD-10-CM

## 2018-02-08 DIAGNOSIS — E66.09 CLASS 1 OBESITY DUE TO EXCESS CALORIES WITHOUT SERIOUS COMORBIDITY IN ADULT, UNSPECIFIED BMI: ICD-10-CM

## 2018-02-08 DIAGNOSIS — Z13.1 SCREENING FOR DIABETES MELLITUS: ICD-10-CM

## 2018-02-08 DIAGNOSIS — Z13.220 SCREENING CHOLESTEROL LEVEL: ICD-10-CM

## 2018-02-08 DIAGNOSIS — J30.2 OTHER SEASONAL ALLERGIC RHINITIS: ICD-10-CM

## 2018-02-08 LAB
GLUCOSE SERPL-MCNC: 72 MG/DL (ref 60–99)
HEMOGLOBIN: 13.4 G/DL (ref 11.7–15.7)

## 2018-02-08 PROCEDURE — 80061 LIPID PANEL: CPT | Mod: 90 | Performed by: FAMILY MEDICINE

## 2018-02-08 PROCEDURE — 99395 PREV VISIT EST AGE 18-39: CPT | Performed by: FAMILY MEDICINE

## 2018-02-08 PROCEDURE — 82947 ASSAY GLUCOSE BLOOD QUANT: CPT | Performed by: FAMILY MEDICINE

## 2018-02-08 PROCEDURE — 36415 COLL VENOUS BLD VENIPUNCTURE: CPT | Performed by: FAMILY MEDICINE

## 2018-02-08 PROCEDURE — 85018 HEMOGLOBIN: CPT | Performed by: FAMILY MEDICINE

## 2018-02-08 RX ORDER — MONTELUKAST SODIUM 10 MG/1
10 TABLET ORAL AT BEDTIME
Qty: 90 TABLET | Refills: 3 | Status: SHIPPED | OUTPATIENT
Start: 2018-02-08 | End: 2019-02-11

## 2018-02-08 RX ORDER — LEVALBUTEROL TARTRATE 45 UG/1
2 AEROSOL, METERED ORAL EVERY 4 HOURS PRN
Qty: 2 INHALER | Refills: 3 | Status: SHIPPED | OUTPATIENT
Start: 2018-02-08 | End: 2019-02-11

## 2018-02-08 RX ORDER — FEXOFENADINE HCL 180 MG/1
180 TABLET ORAL DAILY
Qty: 90 TABLET | Refills: 3 | Status: SHIPPED | OUTPATIENT
Start: 2018-02-08 | End: 2019-01-30

## 2018-02-08 RX ORDER — DROSPIRENONE AND ETHINYL ESTRADIOL 0.03MG-3MG
1 KIT ORAL DAILY
Qty: 84 TABLET | Refills: 3 | Status: SHIPPED | OUTPATIENT
Start: 2018-02-08 | End: 2019-01-25

## 2018-02-08 NOTE — PATIENT INSTRUCTIONS
Total calcium intake of 1500 mgm/day, vitamin D 400-800IU/day and a regular weight bearing exercise program for prevention of osteoporosis is recommended treatment at this time.      (J45.20) Asthma, intermittent, uncomplicated  Comment: follow AAP  Plan: montelukast (SINGULAIR) 10 MG tablet,         levalbuterol (XOPENEX HFA) 45 MCG/ACT Inhaler,         Asthma Action Plan (AAP)        refilled    (E66.09) Class 1 obesity due to excess calories without serious comorbidity in adult, unspecified BMI  Plan: VENIPUNC FNGR,HEEL,EAR [37057], LIPID PANEL,         Glucose Fasting (BFP)        A low fat diet, regular aerobic exercise like walking 30 minutes daily and weight control is the treatment recommendations at this time

## 2018-02-08 NOTE — LETTER
My Asthma Action Plan  Name: Prudence Feliz   YOB: 1985  Date: 2/8/2018   My doctor: Lanny Gaines MD   My clinic: [facility]      My Control Medicine: Montelukast (Singulair) -  10 mg daily  allergy medications  My Rescue Medicine: Levalbuterol (Xopenex) inhaler prn   My Asthma Severity: intermittent  My Best Peak Flow Number:NA  Avoid your asthma triggers: upper respiratory infections, dust mites, pollens and exercise or sports               GREEN ZONE   Good Control    I feel good    No cough or wheeze    Can work, sleep and play without asthma symptoms    My Peak Flow number is above NA (>80% of Best)       Take your asthma control medicine every day.     1. If exercise triggers your asthma, take your rescue medication    15 minutes before exercise or sports, and    During exercise if you have asthma symptoms  2. Spacer to use with inhaler: If you have a spacer, make sure to use it with your inhaler             YELLOW ZONE Getting Worse  I have ANY of these:    I do not feel good    Cough or wheeze    Chest feels tight    Wake up at night    My Peak Flow number is between NA (50%) and NA (80%)   1. Keep taking your Green Zone medications  2. Start taking your rescue medicine:    every 20 minutes for up to 1 hour. Then every 4 hours for 24-48 hours.  3. If you stay in the Yellow Zone for more than 12-24 hours, contact your doctor.  4. If you do not return to the Green Zone in 12-24 hours or you get worse, start taking your oral steroid medicine if prescribed by your provider.           RED ZONE Medical Alert - Get Help  I have ANY of these:    I feel awful    Medicine is not helping    Breathing getting harder    Trouble walking or talking    Nose opens wide to breathe    My Peak Flow number is below Na (50%)       1. Take your rescue medicine NOW  2. If your provider has prescribed an oral steroid medicine, start taking it NOW  3. Call your doctor NOW  4. If you  are still in the Red Zone after 20 minutes and you have not reached your doctor:    Take your rescue medicine again and    Call 911 or go to the emergency room right away    See your regular doctor within 2 weeks of an Emergency Room or Urgent Care visit for follow-up treatment.        Electronically signed by: Lanny Gaines, February 8, 2018    Annual Reminders:  Meet with Asthma Educator,  Flu Shot in the Fall, consider Pneumonia Vaccination for patients with asthma (aged 19 and older).    Pharmacy:    [facility] [address] [facility] [address] [facility] [address] [place], [place] - [address] [facility] [address]                    Asthma Triggers  How To Control Things That Make Your Asthma Worse    Triggers are things that make your asthma worse.  Look at the list below to help you find your triggers and what you can do about them.  You can help prevent asthma flare-ups by staying away from your triggers.      Trigger                                                          What you can do   Cigarette Smoke  Tobacco smoke can make asthma worse. Do not allow smoking in your home, car or around you.  Be sure no one smokes at a child s day care or school.  If you smoke, ask your health care provider for ways to help you quit.  Ask family members to quit too.  Ask your health care provider for a referral to Quit Plan to help you quit smoking, or call 6-020-554-PLAN.     Colds, Flu, Bronchitis  These are common triggers of asthma. Wash your hands often.  Don t touch your eyes, nose or mouth.  Get a flu shot every year.     Dust Mites  These are tiny bugs that live in cloth or carpet. They are too small to see. Wash sheets and blankets in hot water every week.   Encase pillows and mattress in dust mite proof covers.  Avoid having carpet if you can. If you have carpet, vacuum weekly.   Use a dust mask and HEPA  vacuum.   Pollen and Outdoor Mold  Some people are allergic to trees, grass, or weed pollen, or molds. Try to keep your windows closed.  Limit time out doors when pollen count is high.   Ask you health care provider about taking medicine during allergy season.     Animal Dander  Some people are allergic to skin flakes, urine or saliva from pets with fur or feathers. Keep pets with fur or feathers out of your home.    If you can t keep the pet outdoors, then keep the pet out of your bedroom.  Keep the bedroom door closed.  Keep pets off cloth furniture and away from stuffed toys.     Mice, Rats, and Cockroaches  Some people are allergic to the waste from these pests.   Cover food and garbage.  Clean up spills and food crumbs.  Store grease in the refrigerator.   Keep food out of the bedroom.   Indoor Mold  This can be a trigger if your home has high moisture. Fix leaking faucets, pipes, or other sources of water.   Clean moldy surfaces.  Dehumidify basement if it is damp and smelly.   Smoke, Strong Odors, and Sprays  These can reduce air quality. Stay away from strong odors and sprays, such as perfume, powder, hair spray, paints, smoke incense, paint, cleaning products, candles and new carpet.   Exercise or Sports  Some people with asthma have this trigger. Be active!  Ask your doctor about taking medicine before sports or exercise to prevent symptoms.    Warm up for 5-10 minutes before and after sports or exercise.     Other Triggers of Asthma  Cold air:  Cover your nose and mouth with a scarf.  Sometimes laughing or crying can be a trigger.  Some medicines and food can trigger asthma.

## 2018-02-08 NOTE — NURSING NOTE
Prudence is here for a CPX, pt is fasting.    Patient is here for a full physical exam.    Pre-Visit Screening :  Immunizations : up to date  Colon Screening : na  Mammogram: na  Asthma Action Test/Plan : given  PHQ9/GAD7 :  phq2  Fall Risk Assessment na    Vitals:  Pulse - regular  My Chart - accepts    CLASSIFICATION OF OVERWEIGHT AND OBESITY BY BMI                         Obesity Class           BMI(kg/m2)  Underweight                                    < 18.5  Normal                                         18.5-24.9  Overweight                                     25.0-29.9  OBESITY                     I                  30.0-34.9                              II                 35.0-39.9  EXTREME OBESITY             III                >40                             Patient's  BMI There is no height or weight on file to calculate BMI.  http://hin.nhlbi.nih.gov/menuplanner/menu.cgi  Questioned patient about current smoking habits.  Pt. has never smoked.    ETOH screening:  Questions:  1-How often do you have a drink containing alcohol?                             1 times per week(s)  2-How many drinks containing alcohol do you have on a typical day when you are         Drinking?                              2 and 3   3- How often do you have 5 or more drinks on one occasion?                              0 per year    Have you ever:  None of the patient's responses to the CAGE screening were positive / Negative CAGE score     The patient has verbalized that it is ok to leave a detailed voice message on the patient's cell phone with results/recommendations from this visit.       Verified [phone] phone number:

## 2018-02-08 NOTE — MR AVS SNAPSHOT
After Visit Summary   2/8/2018    Prudence Feliz    MRN: [id number]           Patient Information     Date Of Birth          1985        Visit Information        Provider Department      2/8/2018 11:30 AM Lanny Gaines MD [facility]        Today's Diagnoses     Encounter for gynecological examination without abnormal finding    -  1    Asthma, intermittent, uncomplicated        Class 1 obesity due to excess calories without serious comorbidity in adult, unspecified BMI        Encounter for surveillance of contraceptive pills        Other seasonal allergic rhinitis        Screening for diabetes mellitus        Screening cholesterol level          Care Instructions    Total calcium intake of 1500 mgm/day, vitamin D 400-800IU/day and a regular weight bearing exercise program for prevention of osteoporosis is recommended treatment at this time.      (J45.20) Asthma, intermittent, uncomplicated  Comment: follow AAP  Plan: montelukast (SINGULAIR) 10 MG tablet,         levalbuterol (XOPENEX HFA) 45 MCG/ACT Inhaler,         Asthma Action Plan (AAP)        refilled    (E66.09) Class 1 obesity due to excess calories without serious comorbidity in adult, unspecified BMI  Plan: VENIPUNC FNGR,HEEL,EAR [87935], LIPID PANEL,         Glucose Fasting (BFP)        A low fat diet, regular aerobic exercise like walking 30 minutes daily and weight control is the treatment recommendations at this time            Follow-ups after your visit        Follow-up notes from your care team     Return in about 1 year (around 2/8/2019), or if symptoms worsen or fail to improve.      Who to contact     If you have questions or need follow up information about today's clinic visit or your schedule please contact [facility] directly at [phone].  Normal or non-critical lab and imaging results will be communicated to you by MyChart, letter or phone within 4 business days  "after the clinic has received the results. If you do not hear from us within 7 days, please contact the clinic through [employer] or phone. If you have a critical or abnormal lab result, we will notify you by phone as soon as possible.  Submit refill requests through [employer] or call your pharmacy and they will forward the refill request to us. Please allow 3 business days for your refill to be completed.          Additional Information About Your Visit        [employer] Information     [employer] gives you secure access to your electronic health record. If you see a primary care provider, you can also send messages to your care team and make appointments. If you have questions, please call your primary care clinic.  If you do not have a primary care provider, please call [phone] and they will assist you.        Care EveryWhere ID     This is your Care EveryWhere ID. This could be used by other organizations to access your [place] medical records  [id number]        Your Vitals Were     Pulse Temperature Respirations Height Last Period Pulse Oximetry    80 98.4  F (36.9  C) (Oral) 16 1.689 m (5' 6.5\") 01/25/2018 98%    Breastfeeding? BMI (Body Mass Index)                No 33.8 kg/m2           Blood Pressure from Last 3 Encounters:   02/08/18 132/86   09/25/17 110/70   06/05/17 110/80    Weight from Last 3 Encounters:   02/08/18 96.4 kg (212 lb 9.6 oz)   09/25/17 93 kg (205 lb)   06/05/17 90.3 kg (199 lb)              We Performed the Following     Asthma Action Plan (AAP)     Glucose Fasting (BFP)     HEMOGLOBIN [82809.000]     LIPID PANEL     VENIPUNC FNGR,HEEL,EAR [24140]          Today's Medication Changes          These changes are accurate as of 2/8/18 12:35 PM.  If you have any questions, ask your nurse or doctor.               These medicines have changed or have updated prescriptions.        Dose/Directions    drospirenone-ethinyl estradiol 3-0.03 MG per tablet   Commonly known as:  DAVID   This may have " changed:  See the new instructions.   Used for:  Encounter for surveillance of contraceptive pills   Changed by:  Lanny Gaines MD        Dose:  1 tablet   Take 1 tablet by mouth daily   Quantity:  84 tablet   Refills:  3            Where to get your medicines      [facility] [address] [place] [address] AT [facility] [address] [address], [facility] [address]    Hours:  24-hours Phone:  [phone]     drospirenone-ethinyl estradiol 3-0.03 MG per tablet    fexofenadine 180 MG tablet    levalbuterol 45 MCG/ACT Inhaler    montelukast 10 MG tablet                Primary Care Provider Office Phone # Fax #    Lanny Gaines [phone] [address] [address]        Equal Access to Services     [facility]: Hadii aad ku hadasho Soomaali, waaxda luqadaha, qaybta kaalmada adeegyada, waxay mary louin hayaan adetin kahn . So [facility] [phone].    ATENCIÓN: Si habla español, tiene a yoon disposición servicios gratuitos de asistencia lingüística. [facility] [phone].    We comply with applicable federal civil rights laws and [place] laws. We do not discriminate on the basis of race, color, national origin, age, disability, sex, sexual orientation, or gender identity.            Thank you!     Thank you for choosing [facility]  for your care. Our goal is always to provide you with excellent care. Hearing back from our patients is one way we can continue to improve our services. Please take a few minutes to complete the written survey that you may receive in the mail after your visit with us. Thank you!             Your Updated Medication List - Protect others around you: Learn how to safely use, store and throw away your medicines at www.disposemymeds.org.          This list is accurate as of 2/8/18 12:35 PM.  Always use your most recent med list.                    Brand Name Dispense Instructions for use Diagnosis    drospirenone-ethinyl estradiol 3-0.03 MG per tablet    DAVID    84 tablet    Take 1 tablet by mouth daily    Encounter for surveillance of contraceptive pills       fexofenadine 180 MG tablet    ALLEGRA    90 tablet    Take 1 tablet (180 mg) by mouth daily    Other seasonal allergic rhinitis       fluticasone 50 MCG/ACT spray    FLONASE    16 g    Spray 1-2 sprays into both nostrils daily    Other seasonal allergic rhinitis, Asthma, intermittent, uncomplicated       levalbuterol 45 MCG/ACT Inhaler    XOPENEX HFA    2 Inhaler    Inhale 2 puffs into the lungs every 4 hours as needed for shortness of breath / dyspnea or wheezing    Asthma, intermittent, uncomplicated       montelukast 10 MG tablet    SINGULAIR    90 tablet    Take 1 tablet (10 mg) by mouth At Bedtime    Other seasonal allergic rhinitis, Asthma, intermittent, uncomplicated

## 2018-02-08 NOTE — PROGRESS NOTES
SUBJECTIVE:  Prudence Feliz is an 32 year old G 0 P 0 woman who presents for   annual gyn exam. Patient's last menstrual period was 01/25/2018. Periods are regular q 28-30 days, lasting   3 days. Dysmenorrhea:none. Cyclic symptoms   include none. No intermenstrual bleeding,   spotting, or discharge.    Current contraception: oral contraceptives  ANABELA exposure: no  History of abnormal Pap smear: No  Family history of uterine or ovarian cancer: No  Regular self breast exam: Yes  History of abnormal mammogram: No  Family history of breast cancer: No  History of abnormal lipids: No    Past Medical History:   Diagnosis Date     Exercise induced bronchospasm        Family History   Problem Relation Age of Onset     Thyroid Disease Mother      C.A.D. No family hx of      Breast Cancer No family hx of      Cancer - colorectal No family hx of        Past Surgical History:   Procedure Laterality Date     C NONSPECIFIC PROCEDURE  1994    urethroscopy       Current Outpatient Prescriptions   Medication     drospirenone-ethinyl estradiol (DAVID) 3-0.03 MG per tablet     fexofenadine (ALLEGRA) 180 MG tablet     montelukast (SINGULAIR) 10 MG tablet     fluticasone (FLONASE) 50 MCG/ACT spray     levalbuterol (XOPENEX HFA) 45 MCG/ACT Inhaler     No current facility-administered medications for this visit.      Allergies   Allergen Reactions     Cefaclor      C-clor       Social History   Substance Use Topics     Smoking status: Never Smoker     Smokeless tobacco: Never Used     Alcohol use 0.5 oz/week     1 Standard drinks or equivalent per week      Comment: once a week       Review Of Systems  Ears/Nose/Throat: allergies spring and fall  Respiratory: No shortness of breath, dyspnea on exertion, cough, or hemoptysis/asthma well controlled  Cardiovascular: negative  Gastrointestinal: negative  Genitourinary: negative    OBJECTIVE:  BP (!) 132/92 (BP Location: Right arm, Patient Position: Chair, Cuff Size: Adult Large)   "Pulse 80  Temp 98.4  F (36.9  C) (Oral)  Ht 1.689 m (5' 6.5\")  Wt 96.4 kg (212 lb 9.6 oz)  LMP 01/25/2018  SpO2 98%  Breastfeeding? No  BMI 33.8 kg/m2  General appearance: healthy, alert, no distress, cooperative, smiling and over weight  Skin: Skin color, texture, turgor normal. No rashes or lesions., positives: fair numerous benign nevi scattered  Ears: negative  Nose/Sinuses: Nares normal. Septum midline. Mucosa normal. No drainage or sinus tenderness.  Oropharynx: Lips, mucosa, and tongue normal. Teeth and gums normal.  Neck: Neck supple. No adenopathy. Thyroid symmetric, normal size,, Carotids without bruits.  Lungs: negative, Percussion normal. Good diaphragmatic excursion. Lungs clear  Heart: negative, PMI normal. No lifts, heaves, or thrills. RRR. No murmurs, clicks gallops or rub  Breasts: Inspection negative. No nipple discharge or bleeding. No masses.  Abdomen: Abdomen soft, non-tender. BS normal. No masses, organomegaly, positive findings: obese  Pelvic: External genitalia and vagina normal. Bimanual and rectovaginal normal.  BMI : Body mass index is 33.8 kg/(m^2).    ASSESSMENT:  (Z01.419) Encounter for gynecological examination without abnormal finding  (primary encounter diagnosis)  Plan: VENIPUNC FNGR,HEEL,EAR [22660], HEMOGLOBIN         [00070.000]        Total calcium intake of 1500 mgm/day, vitamin D 400-800IU/day and a regular weight bearing exercise program for prevention of osteoporosis is recommended treatment at this time.      (J45.20) Asthma, intermittent, uncomplicated  Comment: follow AAP  Plan: montelukast (SINGULAIR) 10 MG tablet,         levalbuterol (XOPENEX HFA) 45 MCG/ACT Inhaler,         Asthma Action Plan (AAP)        refilled    (E66.09) Class 1 obesity due to excess calories without serious comorbidity in adult, unspecified BMI  Plan: VENIPUNC FNGR,HEEL,EAR [61772], LIPID PANEL,         Glucose Fasting (BFP)        A low fat diet, regular aerobic exercise like walking 30 " minutes daily and weight control is the treatment recommendations at this time      (Z30.41) Encounter for surveillance of contraceptive pills  Plan: drospirenone-ethinyl estradiol (DAVID) 3-0.03         MG per tablet, VENIPUNC FNGR,HEEL,EAR [43641],         HEMOGLOBIN [35401.000]        Potential medication side effects were discussed with the patient; let me know if any occur.  Refilled one year    (J30.2) Other seasonal allergic rhinitis  Plan: montelukast (SINGULAIR) 10 MG tablet,         fexofenadine (ALLEGRA) 180 MG tablet, Asthma         Action Plan (AAP)            (Z13.1) Screening for diabetes mellitus  Plan: VENIPUNC FNGR,HEEL,EAR [98629], Glucose Fasting        (BFP)            (Z13.220) Screening cholesterol level  Plan: VENIPUNC FNGR,HEEL,EAR [14710], LIPID PANEL              Dx:  1)  Pap smear  2)  Mammography, lipids at appropriate intervals      PE:  Reviewed health maintenance including diet, regular exercise   and periodic exams.

## 2018-02-09 LAB
CHOLEST SERPL-MCNC: 231 MG/DL
CHOLEST/HDLC SERPL: 3 (CALC)
HDLC SERPL-MCNC: 78 MG/DL
LDLC SERPL CALC-MCNC: 125 MG/DL (CALC)
NONHDLC SERPL-MCNC: 153 MG/DL (CALC)
TRIGL SERPL-MCNC: 162 MG/DL

## 2018-02-09 ASSESSMENT — ASTHMA QUESTIONNAIRES: ACT_TOTALSCORE: 25

## 2018-09-05 ENCOUNTER — VIRTUAL VISIT (OUTPATIENT)
Dept: FAMILY MEDICINE | Facility: OTHER | Age: 33
End: 2018-09-05

## 2018-09-05 NOTE — PROGRESS NOTES
"Date:   Clinician: David Rubio  Clinician NPI: [id number]  Patient: Prudence Feliz  Patient : 1985  Patient Address: [address] [place] [address]  Patient Phone: [phone]  Visit Protocol: UTI  Patient Summary:  Prudence is a 33 year old ( : 1985 ) female who initiated a Visit for a presumed bladder infection. When asked the question \"Please sign me up to receive news, health information and promotions from [employer].\", Prudence responded \"No\".    Her symptoms began 4 days ago and consist of dysuria, foul smelling urine, hesitation, urgency, and urinary frequency.   Symptom Details   Urinary Frequency: Several times each hour    She denies nausea, hematuria, urinary incontinence, vaginal discharge, abdominal pain, recent antibiotic use, chills, vomiting, loss of appetite, feeling feverish, and flank pain. Prudence has never had kidney stones. She has not been hospitalized, been a patient in a nursing home, or had a catheter in the past two weeks. She denies risk factors for a sexually transmitted disease.  Prudence has not had any UTIs in the past 12 months. Her current symptoms are similar to the previous UTI symptoms. She took an antibiotic for her last infection but does not remember which one.   Prudence does not get yeast infections when she takes antibiotics.   She denies pregnancy and denies breastfeeding. She has menstruated in the past month.   MEDICATIONS: fexofenadine-pseudoephedrine oral, Allegra-D 12 Hour oral, Jill (28) oral, ALLERGIES: Ceclor  Clinician Response:  Dear Prudence,  Based on the information you have provided, you likely have a bladder infection, also called acute urinary tract infection (UTI).   To treat your infection, I am prescribing:    Nitrofurantoin monohyd/m-cryst (Macrobid) 100 mg oral capsule. Take 1 capsule by mouth every 12 hours for 5 days. Take the medication with food. Continue taking the capsules even if you feel better before " all of the medication is gone. There are no refills with this prescription.  Some women may develop a yeast infection as a side effect of taking antibiotics. If you notice symptoms of a yeast infection, [facility] can help treat that condition as well. Simply log in and complete another Visit, which will cover all of the necessary questions to determine the best treatment for you.   Some people develop allergies to antibiotics. If you notice a new rash, significant swelling, or difficulty breathing, stop the medication immediately and go into a clinic for physical evaluation.   If you become pregnant during this course of treatment, stop taking the medication and contact your primary care provider.   To help treat your current UTI and prevent future occurrences, remember to:     Drink 8-10, 8-ounce glasses of water daily.    Urinate after sexual intercourse.    Wipe front to back after using the bathroom.     You should visit a clinic for a follow-up visit if your symptoms do not improve in 1-2 days or if you experience another urinary tract infection soon after completing this treatment.   Diagnosis: Acute uncomplicated bladder infection  Diagnosis ICD: N39.0  Prescription: nitrofurantoin monohyd/m-cryst (Macrobid) 100 mg oral capsule 10 capsule, 5 days supply. Take 1 capsule by mouth every 12 hours for 5 days. Refills: 0, Refill as needed: no, Allow substitutions: yes  Pharmacy: [facility] [id number] - [phone] - [address] [address]

## 2018-12-04 ENCOUNTER — OFFICE VISIT (OUTPATIENT)
Dept: FAMILY MEDICINE | Facility: CLINIC | Age: 33
End: 2018-12-04

## 2018-12-04 VITALS
WEIGHT: 212.6 LBS | TEMPERATURE: 98.6 F | RESPIRATION RATE: 16 BRPM | HEART RATE: 84 BPM | BODY MASS INDEX: 33.37 KG/M2 | DIASTOLIC BLOOD PRESSURE: 80 MMHG | SYSTOLIC BLOOD PRESSURE: 122 MMHG | HEIGHT: 67 IN | OXYGEN SATURATION: 99 %

## 2018-12-04 DIAGNOSIS — N30.01 ACUTE CYSTITIS WITH HEMATURIA: Primary | ICD-10-CM

## 2018-12-04 DIAGNOSIS — J45.20 ASTHMA, INTERMITTENT, UNCOMPLICATED: ICD-10-CM

## 2018-12-04 LAB
ALBUMIN (URINE): ABNORMAL MG/DL
APPEARANCE UR: ABNORMAL
BACTERIA, UR: ABNORMAL
BILIRUB UR QL: ABNORMAL
CASTS/LPF: ABNORMAL
COLOR UR: YELLOW
EP/HPF: ABNORMAL
GLUCOSE URINE: ABNORMAL MG/DL
HGB UR QL: ABNORMAL
KETONES UR QL: ABNORMAL MG/DL
LEUKOCYTE ESTERASE - QUEST: ABNORMAL
MISC.: ABNORMAL
NITRITE UR QL STRIP: ABNORMAL
PH UR STRIP: 6 PH (ref 5–7)
RBC, UR MICRO: ABNORMAL (ref ?–2)
SP. GRAVITY: 1.01
UROBILINOGEN UR QL STRIP: 0.2 EU/DL (ref 0.2–1)
WBC, UR MICRO: ABNORMAL (ref ?–2)

## 2018-12-04 PROCEDURE — 87077 CULTURE AEROBIC IDENTIFY: CPT | Mod: 90 | Performed by: FAMILY MEDICINE

## 2018-12-04 PROCEDURE — 87086 URINE CULTURE/COLONY COUNT: CPT | Mod: 90 | Performed by: FAMILY MEDICINE

## 2018-12-04 PROCEDURE — 99213 OFFICE O/P EST LOW 20 MIN: CPT | Performed by: FAMILY MEDICINE

## 2018-12-04 PROCEDURE — 81001 URINALYSIS AUTO W/SCOPE: CPT | Performed by: FAMILY MEDICINE

## 2018-12-04 PROCEDURE — 87186 SC STD MICRODIL/AGAR DIL: CPT | Mod: 90 | Performed by: FAMILY MEDICINE

## 2018-12-04 PROCEDURE — 87088 URINE BACTERIA CULTURE: CPT | Mod: 90 | Performed by: FAMILY MEDICINE

## 2018-12-04 RX ORDER — SULFAMETHOXAZOLE/TRIMETHOPRIM 800-160 MG
1 TABLET ORAL 2 TIMES DAILY
Qty: 6 TABLET | Refills: 0 | Status: SHIPPED | OUTPATIENT
Start: 2018-12-04 | End: 2019-01-30

## 2018-12-04 NOTE — MR AVS SNAPSHOT
After Visit Summary   12/4/2018    Prudence Feliz    MRN: [id number]           Patient Information     Date Of Birth          1985        Visit Information        Provider Department      12/4/2018 5:45 PM Lanny Gaines MD [facility]        Today's Diagnoses     Acute cystitis with hematuria    -  1    Asthma, intermittent, uncomplicated          Care Instructions    Patient was instructed to drink plenty of fluids, urinate frequently and to contact the office promptly should she notice fever greater than 102, increase in discomfort, skin rash, lack of improvement after 2 days of treatment, or the appearance of new symptoms.            Follow-ups after your visit        Follow-up notes from your care team     Return if symptoms worsen or fail to improve.      Who to contact     If you have questions or need follow up information about today's clinic visit or your schedule please contact [facility] directly at [phone].  Normal or non-critical lab and imaging results will be communicated to you by MyChart, letter or phone within 4 business days after the clinic has received the results. If you do not hear from us within 7 days, please contact the clinic through [employer] or phone. If you have a critical or abnormal lab result, we will notify you by phone as soon as possible.  Submit refill requests through [employer] or call your pharmacy and they will forward the refill request to us. Please allow 3 business days for your refill to be completed.          Additional Information About Your Visit        MyChart Information     [employer] gives you secure access to your electronic health record. If you see a primary care provider, you can also send messages to your care team and make appointments. If you have questions, please call your primary care clinic.  If you do not have a primary care provider, please call [phone] and they will assist  "you.        Care EveryWhere ID     This is your Care EveryWhere ID. This could be used by other organizations to access your [place] medical records  [id number]        Your Vitals Were     Pulse Temperature Respirations Height Last Period Pulse Oximetry    84 98.6  F (37  C) (Oral) 16 1.689 m (5' 6.5\") 11/28/2018 99%    BMI (Body Mass Index)                   33.8 kg/m2            Blood Pressure from Last 3 Encounters:   12/04/18 122/80   02/08/18 132/86   09/25/17 110/70    Weight from Last 3 Encounters:   12/04/18 96.4 kg (212 lb 9.6 oz)   02/08/18 96.4 kg (212 lb 9.6 oz)   09/25/17 93 kg (205 lb)              We Performed the Following     HCL  Urinalysis, Routine (BFP)     Urine Culture Aerobic Bacterial          Today's Medication Changes          These changes are accurate as of 12/4/18  6:24 PM.  If you have any questions, ask your nurse or doctor.               Start taking these medicines.        Dose/Directions    sulfamethoxazole-trimethoprim 800-160 MG tablet   Commonly known as:  BACTRIM DS/SEPTRA DS   Used for:  Acute cystitis with hematuria   Started by:  Lanny Gaines MD        Dose:  1 tablet   Take 1 tablet by mouth 2 times daily for 3 days   Quantity:  6 tablet   Refills:  0            Where to get your medicines      These medications were sent to [facility] [address] [place] [address] AT [facility] [address] [address], [facility] [address]    Hours:  24-hours Phone:  [phone]     sulfamethoxazole-trimethoprim 800-160 MG tablet                Primary Care Provider Office Phone # Fax #    Lanny Gaines [phone] [address] [address]        Equal Access to Services     MARCEL ENCARNACION AH: Eitan Banuelos, waaxda luqadaha, qaybta kaalmada nikky, dilcia varela. So [facility] [phone].    ATENCIÓN: Si habla español, tiene a yoon disposición servicios " chris de asistencia lingüística. Robin villavicencio [phone].    We comply with applicable federal civil rights laws and [place] laws. We do not discriminate on the basis of race, color, national origin, age, disability, sex, sexual orientation, or gender identity.            Thank you!     Thank you for choosing [facility]  for your care. Our goal is always to provide you with excellent care. Hearing back from our patients is one way we can continue to improve our services. Please take a few minutes to complete the written survey that you may receive in the mail after your visit with us. Thank you!             Your Updated Medication List - Protect others around you: Learn how to safely use, store and throw away your medicines at www.disposemymeds.org.          This list is accurate as of 12/4/18  6:24 PM.  Always use your most recent med list.                   Brand Name Dispense Instructions for use Diagnosis    drospirenone-ethinyl estradiol 3-0.03 MG tablet    DAVID    84 tablet    Take 1 tablet by mouth daily    Encounter for surveillance of contraceptive pills       fexofenadine 180 MG tablet    ALLEGRA    90 tablet    Take 1 tablet (180 mg) by mouth daily    Other seasonal allergic rhinitis       fluticasone 50 MCG/ACT nasal spray    FLONASE    16 g    Spray 1-2 sprays into both nostrils daily    Other seasonal allergic rhinitis, Asthma, intermittent, uncomplicated       levalbuterol 45 MCG/ACT inhaler    XOPENEX HFA    2 Inhaler    Inhale 2 puffs into the lungs every 4 hours as needed for shortness of breath / dyspnea or wheezing    Asthma, intermittent, uncomplicated       montelukast 10 MG tablet    SINGULAIR    90 tablet    Take 1 tablet (10 mg) by mouth At Bedtime    Other seasonal allergic rhinitis, Asthma, intermittent, uncomplicated       sulfamethoxazole-trimethoprim 800-160 MG tablet    BACTRIM DS/SEPTRA DS    6 tablet    Take 1 tablet by mouth 2 times daily for 3 days     Acute cystitis with hematuria

## 2018-12-04 NOTE — NURSING NOTE
Prudence is here today for a UTI.    Pre-visit Screening:  Immunizations:  up to date  Colonoscopy:  NA  Mammogram: NA  Asthma Action Test/Plan:  Done today  PHQ9:  NA  GAD7:  NA  Questioned patient about current smoking habits Pt. has never smoked.  Ok to leave detailed message on voice mail for today's visit only Yes, phone # [phone]

## 2018-12-05 ASSESSMENT — ASTHMA QUESTIONNAIRES: ACT_TOTALSCORE: 24

## 2018-12-05 NOTE — PROGRESS NOTES
"SUBJECTIVE:   Prudence Feliz is an 33 year old year old who presents with suspected urinary tract   infection. Her symptoms began 3 days ago and   include dysuria and frequency.     Predisposing factors: recent period  Hx of previous UTI s: rare   Sexually active: not recently/ OCP use    Current Outpatient Prescriptions   Medication     drospirenone-ethinyl estradiol (DAVID) 3-0.03 MG per tablet     fexofenadine (ALLEGRA) 180 MG tablet     fluticasone (FLONASE) 50 MCG/ACT spray     levalbuterol (XOPENEX HFA) 45 MCG/ACT Inhaler     montelukast (SINGULAIR) 10 MG tablet     No current facility-administered medications for this visit.       ROS: 10 point ROS neg other than the symptoms noted above in the HPI.       Allergies   Allergen Reactions     Cefaclor      C-clor       OBJECTIVE:   Vitals:    12/04/18 1755   BP: 122/80   BP Location: Right arm   Patient Position: Sitting   Cuff Size: Adult Large   Pulse: 84   Resp: 16   Temp: 98.6  F (37  C)   TempSrc: Oral   SpO2: 99%   Weight: 96.4 kg (212 lb 9.6 oz)   Height: 1.689 m (5' 6.5\")       General appearance: Alert, oriented, well hydrated. Skin turgor normal.    Hydration: well hydrated   CVA tenderness to percussion: none  Abdomen: The abdomen is soft without tenderness, guarding, mass or organomegaly. Bowel sounds are normal. No CVA tenderness or inguinal adenopathy noted.    Tenderness: none   Masses: none  Organomegaly: none    UA:Urine dipstick shows positive for RBC's, positive for leukocytes and cloudy.  Micro exam: 25-50 WBC's per HPF and 5-10 RBC's per HPF.     ASSESSMENT/PLAN: (N30.01) Acute cystitis with hematuria  (primary encounter diagnosis)  Plan: HCL  Urinalysis, Routine (BFP), Urine Culture         Aerobic Bacterial,         sulfamethoxazole-trimethoprim (BACTRIM         DS/SEPTRA DS) 800-160 MG tablet        Patient was instructed to drink plenty of fluids, urinate frequently and to contact the office promptly should she notice fever " greater than 102, increase in discomfort, skin rash, lack of improvement after 2 days of treatment, or the appearance of new symptoms.      (J45.20) Asthma, intermittent, uncomplicated  Plan: see ACT/ doing well with winter changes.

## 2018-12-08 LAB — URINE VOIDED CULTURE: ABNORMAL

## 2019-01-30 ENCOUNTER — OFFICE VISIT (OUTPATIENT)
Dept: FAMILY MEDICINE | Facility: CLINIC | Age: 34
End: 2019-01-30

## 2019-01-30 VITALS
OXYGEN SATURATION: 99 % | RESPIRATION RATE: 20 BRPM | WEIGHT: 203.8 LBS | HEIGHT: 65 IN | HEART RATE: 106 BPM | DIASTOLIC BLOOD PRESSURE: 84 MMHG | SYSTOLIC BLOOD PRESSURE: 124 MMHG | TEMPERATURE: 99 F | BODY MASS INDEX: 33.95 KG/M2

## 2019-01-30 DIAGNOSIS — J02.0 STREPTOCOCCAL SORE THROAT: Primary | ICD-10-CM

## 2019-01-30 PROBLEM — J02.9 PHARYNGITIS, ACUTE: Status: ACTIVE | Noted: 2019-01-30

## 2019-01-30 LAB — S PYO AG THROAT QL IA.RAPID: ABNORMAL

## 2019-01-30 PROCEDURE — 99213 OFFICE O/P EST LOW 20 MIN: CPT | Performed by: FAMILY MEDICINE

## 2019-01-30 PROCEDURE — 87880 STREP A ASSAY W/OPTIC: CPT | Performed by: FAMILY MEDICINE

## 2019-01-30 RX ORDER — AZITHROMYCIN 250 MG/1
TABLET, FILM COATED ORAL
Qty: 6 TABLET | Refills: 0 | Status: SHIPPED | OUTPATIENT
Start: 2019-01-30 | End: 2019-02-11

## 2019-01-30 SDOH — ECONOMIC STABILITY: FOOD INSECURITY: WITHIN THE PAST 12 MONTHS, YOU WORRIED THAT YOUR FOOD WOULD RUN OUT BEFORE YOU GOT MONEY TO BUY MORE.: NEVER TRUE

## 2019-01-30 SDOH — ECONOMIC STABILITY: INCOME INSECURITY: HOW HARD IS IT FOR YOU TO PAY FOR THE VERY BASICS LIKE FOOD, HOUSING, MEDICAL CARE, AND HEATING?: NOT VERY HARD

## 2019-01-30 SDOH — ECONOMIC STABILITY: FOOD INSECURITY: WITHIN THE PAST 12 MONTHS, THE FOOD YOU BOUGHT JUST DIDN'T LAST AND YOU DIDN'T HAVE MONEY TO GET MORE.: NEVER TRUE

## 2019-01-30 SDOH — ECONOMIC STABILITY: TRANSPORTATION INSECURITY
IN THE PAST 12 MONTHS, HAS THE LACK OF TRANSPORTATION KEPT YOU FROM MEDICAL APPOINTMENTS OR FROM GETTING MEDICATIONS?: NO

## 2019-01-30 SDOH — ECONOMIC STABILITY: TRANSPORTATION INSECURITY
IN THE PAST 12 MONTHS, HAS LACK OF TRANSPORTATION KEPT YOU FROM MEETINGS, WORK, OR FROM GETTING THINGS NEEDED FOR DAILY LIVING?: NO

## 2019-01-30 ASSESSMENT — MIFFLIN-ST. JEOR: SCORE: 1644.27

## 2019-01-30 NOTE — PROGRESS NOTES
"SUBJECTIVE:  Prudence Feliz is an 33 year old female who presents for evaluation and treatment   of sore throat. Symptoms include sore throat, swollen glands and fever. Onset 3   days, gradually worsening since that time. Known Strep exposure:   school exposure and work exposure.    Current Outpatient Medications   Medication     fluticasone (FLONASE) 50 MCG/ACT spray     levalbuterol (XOPENEX HFA) 45 MCG/ACT Inhaler     montelukast (SINGULAIR) 10 MG tablet     LINDEN 3-0.03 MG tablet     No current facility-administered medications for this visit.      Allergies   Allergen Reactions     Cefaclor      C-clor       Social History     Tobacco Use     Smoking status: Never Smoker     Smokeless tobacco: Never Used   Substance Use Topics     Alcohol use: Yes     Alcohol/week: 0.5 oz     Types: 1 Standard drinks or equivalent per week     Comment: once a week       OBJECTIVE:  /84 (BP Location: Left arm, Patient Position: Sitting, Cuff Size: Adult Large)   Pulse 106   Temp 99  F (37.2  C) (Oral)   Ht 1.657 m (5' 5.25\")   Wt 92.4 kg (203 lb 12.8 oz)   SpO2 99%   BMI 33.65 kg/m    General appearance: healthy, alert, no distress, cooperative, smiling and fatigued  Ears: R TM - normal: no effusions, no erythema, and normal landmarks, L TM - normal: no effusions, no erythema, and normal landmarks  Nose: normal  Oropharynx: moderate erythema, tonsillar hypertrophy, 1+, exudates present and rapid strep done  Neck: normal, supple and no adenopathy  Lungs: normal and clear to auscultation  Heart: regular rate and rhythm and no murmurs, clicks, or gallops    ASSESSMENT:  Acute pharyngitis - r/o Strep      RSS positive    Dx:  Strep pharyngitis    Rx:  1)  Symptomatic treatment with fluids, vaporizer, acetaminophen.  2)  Recheck as needed for persistence, worsening, appearance of new   symptoms.  3)  Azithromycin. Because of the slight increase in pregnancy risk, the patient is asked to back up her OCP with " condom or other method during this (or any) cycle during which she is taking antibiotics.      Note for work    PLAN:  Discussed possible etiologies of symptoms and need for antibiotic   treatment if Strep found.

## 2019-01-30 NOTE — LETTER
January 30, 2019      Prudence Feliz  [address] [facility] [address]        To Whom It May Concern:    Prudence Feliz  was seen on 1/30/2019.  Please excuse her  until 2/1/2019 due to illness (she is contagious and starting medications).        Sincerely,        Lanny Gaines MD

## 2019-01-30 NOTE — NURSING NOTE
Karen is here today for a sore throat.    Pre-visit Screening:  Immunizations:  up to date  Colonoscopy:  NA  Mammogram: NA  Asthma Action Test/Plan:  NA  PHQ9:  NA  GAD7:  NA  Questioned patient about current smoking habits Pt. has never smoked.  Ok to leave detailed message on voice mail for today's visit only Yes, phone # [phone]

## 2019-01-30 NOTE — PATIENT INSTRUCTIONS
1)  Symptomatic treatment with fluids, vaporizer, acetaminophen.  2)  Recheck as needed for persistence, worsening, appearance of new   symptoms.  3)  Azithromycin. Because of the slight increase in pregnancy risk, the patient is asked to back up her OCP with condom or other method during this (or any) cycle during which she is taking antibiotics.      Note for work

## 2019-02-11 ENCOUNTER — OFFICE VISIT (OUTPATIENT)
Dept: FAMILY MEDICINE | Facility: CLINIC | Age: 34
End: 2019-02-11

## 2019-02-11 VITALS
DIASTOLIC BLOOD PRESSURE: 80 MMHG | TEMPERATURE: 98.1 F | HEIGHT: 65 IN | RESPIRATION RATE: 16 BRPM | WEIGHT: 200 LBS | OXYGEN SATURATION: 99 % | BODY MASS INDEX: 33.32 KG/M2 | HEART RATE: 113 BPM | SYSTOLIC BLOOD PRESSURE: 122 MMHG

## 2019-02-11 DIAGNOSIS — Z12.4 ENCOUNTER FOR SCREENING FOR CERVICAL CANCER: Primary | ICD-10-CM

## 2019-02-11 DIAGNOSIS — J30.2 OTHER SEASONAL ALLERGIC RHINITIS: ICD-10-CM

## 2019-02-11 DIAGNOSIS — Z01.411 ENCOUNTER FOR GYNECOLOGICAL EXAMINATION WITH ABNORMAL FINDING: ICD-10-CM

## 2019-02-11 DIAGNOSIS — J45.20 ASTHMA, INTERMITTENT, UNCOMPLICATED: ICD-10-CM

## 2019-02-11 DIAGNOSIS — Z13.1 SCREENING FOR DIABETES MELLITUS: ICD-10-CM

## 2019-02-11 DIAGNOSIS — Z13.220 SCREENING CHOLESTEROL LEVEL: ICD-10-CM

## 2019-02-11 DIAGNOSIS — E66.811 CLASS 1 OBESITY DUE TO EXCESS CALORIES WITHOUT SERIOUS COMORBIDITY IN ADULT, UNSPECIFIED BMI: ICD-10-CM

## 2019-02-11 DIAGNOSIS — Z30.09 GENERAL COUNSELING FOR PRESCRIPTION OF ORAL CONTRACEPTIVES: ICD-10-CM

## 2019-02-11 DIAGNOSIS — E66.09 CLASS 1 OBESITY DUE TO EXCESS CALORIES WITHOUT SERIOUS COMORBIDITY IN ADULT, UNSPECIFIED BMI: ICD-10-CM

## 2019-02-11 LAB
GLUCOSE SERPL-MCNC: 76 MG/DL (ref 60–99)
HEMOGLOBIN: 14.4 G/DL (ref 11.7–15.7)

## 2019-02-11 PROCEDURE — 82947 ASSAY GLUCOSE BLOOD QUANT: CPT | Performed by: FAMILY MEDICINE

## 2019-02-11 PROCEDURE — 99395 PREV VISIT EST AGE 18-39: CPT | Performed by: FAMILY MEDICINE

## 2019-02-11 PROCEDURE — 85018 HEMOGLOBIN: CPT | Performed by: FAMILY MEDICINE

## 2019-02-11 PROCEDURE — 36415 COLL VENOUS BLD VENIPUNCTURE: CPT | Performed by: FAMILY MEDICINE

## 2019-02-11 RX ORDER — FLUTICASONE PROPIONATE 50 MCG
1-2 SPRAY, SUSPENSION (ML) NASAL DAILY
Qty: 16 G | Refills: 11 | Status: SHIPPED | OUTPATIENT
Start: 2019-02-11 | End: 2020-02-14

## 2019-02-11 RX ORDER — MONTELUKAST SODIUM 10 MG/1
10 TABLET ORAL AT BEDTIME
Qty: 90 TABLET | Refills: 3 | Status: SHIPPED | OUTPATIENT
Start: 2019-02-11 | End: 2020-02-14

## 2019-02-11 RX ORDER — LEVALBUTEROL TARTRATE 45 UG/1
2 AEROSOL, METERED ORAL EVERY 4 HOURS PRN
Qty: 2 INHALER | Refills: 3 | Status: SHIPPED | OUTPATIENT
Start: 2019-02-11 | End: 2020-02-14

## 2019-02-11 RX ORDER — DROSPIRENONE AND ETHINYL ESTRADIOL 0.03MG-3MG
1 KIT ORAL DAILY
Qty: 84 TABLET | Refills: 3 | Status: SHIPPED | OUTPATIENT
Start: 2019-02-11 | End: 2020-01-22

## 2019-02-11 ASSESSMENT — MIFFLIN-ST. JEOR: SCORE: 1623.07

## 2019-02-11 NOTE — PROGRESS NOTES
SUBJECTIVE:  Prudence Feliz is an 33 year old G 0 P 0 woman who presents for   annual gyn exam. Patient's last menstrual period was 01/23/2019. Periods are regular q 28-30 days, lasting   4 days. Dysmenorrhea:none. Cyclic symptoms   include none. No intermenstrual bleeding,   spotting, or discharge.    Current contraception: oral contraceptives  ANABELA exposure: no  History of abnormal Pap smear: No  Family history of uterine or ovarian cancer: No  Regular self breast exam: Yes  History of abnormal mammogram: No  Family history of breast cancer: No  History of abnormal lipids: No    Past Medical History:   Diagnosis Date     Exercise induced bronchospasm        Family History   Problem Relation Age of Onset     Thyroid Disease Mother      C.A.D. No family hx of      Breast Cancer No family hx of      Cancer - colorectal No family hx of        Past Surgical History:   Procedure Laterality Date     C NONSPECIFIC PROCEDURE  1994    urethroscopy       Current Outpatient Medications   Medication     fluticasone (FLONASE) 50 MCG/ACT spray     levalbuterol (XOPENEX HFA) 45 MCG/ACT Inhaler     montelukast (SINGULAIR) 10 MG tablet     LINDEN 3-0.03 MG tablet     No current facility-administered medications for this visit.      Allergies   Allergen Reactions     Cefaclor      C-clor       Social History     Tobacco Use     Smoking status: Never Smoker     Smokeless tobacco: Never Used   Substance Use Topics     Alcohol use: Yes     Alcohol/week: 0.5 oz     Types: 1 Standard drinks or equivalent per week     Comment: once a week       Review Of Systems  Ears/Nose/Throat: allergies spring and fall  Respiratory: No shortness of breath, dyspnea on exertion, cough, or hemoptysis and asthma well controlled  Cardiovascular: negative  Gastrointestinal: negative  Genitourinary: negative    OBJECTIVE:  /80 (BP Location: Left arm, Patient Position: Sitting, Cuff Size: Adult Large)   Pulse 113   Temp 98.1  F (36.7  C) (Oral)  "  Ht 1.651 m (5' 5\")   Wt 90.7 kg (200 lb)   LMP 01/23/2019   SpO2 99%   BMI 33.28 kg/m    General appearance: healthy, alert, no distress, cooperative, smiling and over weight  Skin: Skin color, texture, turgor normal. No rashes or lesions. Mild acne  Ears: negative  Nose/Sinuses: Nares normal. Septum midline. Mucosa normal. No drainage or sinus tenderness.  Oropharynx: Lips, mucosa, and tongue normal. Teeth and gums normal.  Neck: Neck supple. No adenopathy. Thyroid symmetric, normal size,, Carotids without bruits.  Lungs: negative, Percussion normal. Good diaphragmatic excursion. Lungs clear  Heart: negative, PMI normal. No lifts, heaves, or thrills. RRR. No murmurs, clicks gallops or rub  Breasts: Inspection negative. No nipple discharge or bleeding. No masses.  Abdomen: Abdomen soft, non-tender. BS normal. No masses, organomegaly  Pelvic: External genitalia and vagina normal. Bimanual and rectovaginal normal.  BMI : Body mass index is 33.28 kg/m .    ASSESSMENT:  (Z12.4) Encounter for screening for cervical cancer   (primary encounter diagnosis)  Plan: ThinPrep Pap and HPV (mRNA E6/E7)         ([facility])        Total calcium intake of 1500 mgm/day, vitamin D 400-800IU/day and a regular weight bearing exercise program for prevention of osteoporosis is recommended treatment at this time.      (Z01.411) Encounter for gynecological examination with abnormal finding  Plan: ThinPrep Pap and HPV (mRNA E6/E7)         ([facility]), CL AFF HEMOGLOBIN (BFP), VENOUS         COLLECTION            (J45.20) Asthma, intermittent, uncomplicated  Comment: follow asthma action plan  Plan: montelukast (SINGULAIR) 10 MG tablet,         levalbuterol (XOPENEX HFA) 45 MCG/ACT inhaler,         fluticasone (FLONASE) 50 MCG/ACT nasal spray        refilled    (J30.2) Other seasonal allergic rhiniti  Plan: montelukast (SINGULAIR) 10 MG tablet,         fluticasone (FLONASE) 50 MCG/ACT nasal spray        refifilld    (Z13.1) Screening for " diabetes mellitus  Plan: VENOUS COLLECTION, Glucose Fasting (BFP)            (Z13.220) Screening cholesterol level  Plan: VENOUS COLLECTION, Lipid Profile ([facility])            (Z30.09) General counseling for prescription of oral contraceptives  Plan: drospirenone-ethinyl estradiol (DAVID) 3-0.03         MG tablet        Talk to pharmacists about options      Dx:  1)  Pap smear  2)  Mammography, lipids at appropriate intervals      PE:  Reviewed health maintenance including diet, regular exercise   and periodic exams.

## 2019-02-11 NOTE — PATIENT INSTRUCTIONS
Talk to pharmacists about generic  Options for oral contraceptives    A low fat diet, regular aerobic exercise like walking 30 minutes daily and weight control is the treatment recommendations at this time

## 2019-02-11 NOTE — NURSING NOTE
Patient is here for a full physical exam.    Pre-Visit Screening :  Immunizations : up to date  Colon Screening : NA  Mammogram: NA  Asthma Action Test/Plan : Done today  PHQ9 :  NA  GAD7 :  NA  Patient's  BMI Body mass index is 33.28 kg/m .  Questioned patient about current smoking habits.  Pt. has never smoked.  OK to leave a detailed voice message regarding today's visit Yes, phone # [phone]      ETOH screening:  Questions:  1-How often do you have a drink containing alcohol?                             1 times per week  2-How many drinks containing alcohol do you have on a typical day when you are         Drinking?                              3   3- How often do you have 5 or more drinks on one occasion?                              Never

## 2019-02-12 LAB
CHOLEST SERPL-MCNC: 215 MG/DL
CHOLEST/HDLC SERPL: 3.1 (CALC)
HDLC SERPL-MCNC: 69 MG/DL
LDLC SERPL CALC-MCNC: 100 MG/DL (CALC)
NONHDLC SERPL-MCNC: 146 MG/DL (CALC)
TRIGL SERPL-MCNC: 348 MG/DL

## 2019-02-12 ASSESSMENT — ASTHMA QUESTIONNAIRES: ACT_TOTALSCORE: 24

## 2019-02-13 LAB
CLINICAL HISTORY - QUEST: NORMAL
COMMENT - QUEST: NORMAL
CYTOTECHNOLOGIST - QUEST: NORMAL
DESCRIPTIVE DIAGNOSIS - QUEST: NORMAL
LAST PAP DX - QUEST: NORMAL
LMP - QUEST: NORMAL
PREV BX DX - QUEST: NORMAL
SOURCE: NORMAL
STATEMENT OF ADEQUACY - QUEST: NORMAL

## 2019-06-10 ENCOUNTER — OFFICE VISIT (OUTPATIENT)
Dept: FAMILY MEDICINE | Facility: CLINIC | Age: 34
End: 2019-06-10

## 2019-06-10 VITALS
TEMPERATURE: 98.4 F | WEIGHT: 171 LBS | DIASTOLIC BLOOD PRESSURE: 82 MMHG | BODY MASS INDEX: 28.46 KG/M2 | SYSTOLIC BLOOD PRESSURE: 112 MMHG | OXYGEN SATURATION: 98 % | HEART RATE: 74 BPM

## 2019-06-10 DIAGNOSIS — N30.01 ACUTE CYSTITIS WITH HEMATURIA: Primary | ICD-10-CM

## 2019-06-10 LAB
ALBUMIN (URINE): 30 MG/DL
APPEARANCE UR: ABNORMAL
BACTERIA, UR: ABNORMAL
BILIRUB UR QL: ABNORMAL
CASTS/LPF: ABNORMAL
COLOR UR: YELLOW
EP/HPF: ABNORMAL
GLUCOSE URINE: ABNORMAL MG/DL
HGB UR QL: ABNORMAL
KETONES UR QL: ABNORMAL MG/DL
LEUKOCYTE ESTERASE - QUEST: ABNORMAL
MISC.: ABNORMAL
NITRITE UR QL STRIP: ABNORMAL
PH UR STRIP: 5.5 PH (ref 5–7)
RBC, UR MICRO: ABNORMAL (ref ?–2)
SP. GRAVITY: 1.02
UROBILINOGEN UR QL STRIP: 0.2 EU/DL (ref 0.2–1)
WBC, UR MICRO: ABNORMAL (ref ?–2)

## 2019-06-10 PROCEDURE — 99213 OFFICE O/P EST LOW 20 MIN: CPT | Performed by: FAMILY MEDICINE

## 2019-06-10 PROCEDURE — 81001 URINALYSIS AUTO W/SCOPE: CPT | Performed by: FAMILY MEDICINE

## 2019-06-10 RX ORDER — CIPROFLOXACIN 500 MG/1
500 TABLET, FILM COATED ORAL 2 TIMES DAILY
Qty: 14 TABLET | Refills: 0 | Status: SHIPPED | OUTPATIENT
Start: 2019-06-10 | End: 2020-02-14

## 2019-06-10 NOTE — PROGRESS NOTES
Subjective     Prudence Feliz is a 34 year old female who presents to clinic today for the following health issues:    HPI   URINARY TRACT SYMPTOMS  Urethral surgery age 6-7  Urology consult 2012 prescribed postcoital antibiotics      Duration:  Symptoms started today    Description  dysuria and frequency    Intensity:  mild    Accompanying signs and symptoms:  Fever/chills: no   Flank pain no   Nausea and vomiting: no   Vaginal symptoms: none  Abdominal/Pelvic Pain: no     History  History of frequent UTI's: YES- last treated in December with bactrim  History of kidney stones: no   Sexually Active: YES- new partner  Possibility of pregnancy: No    Precipitating or alleviating factors:could improve on water intake and regular voiding    Therapies tried and outcome: none   Outcome:        Patient Active Problem List   Diagnosis     Contact dermatitis and other eczema, due to unspecified cause     Obesity     Health Care Home     ACP (advance care planning)     Asthma, intermittent, uncomplicated     Other seasonal allergic rhinitis     Pharyngitis, acute     Past Surgical History:   Procedure Laterality Date     C NONSPECIFIC PROCEDURE  1994    urethroscopy       Social History     Tobacco Use     Smoking status: Never Smoker     Smokeless tobacco: Never Used   Substance Use Topics     Alcohol use: Yes     Alcohol/week: 0.5 oz     Types: 1 Standard drinks or equivalent per week     Comment: once a week     Family History   Problem Relation Age of Onset     Thyroid Disease Mother      C.A.D. No family hx of      Breast Cancer No family hx of      Cancer - colorectal No family hx of          Current Outpatient Medications   Medication Sig Dispense Refill     ciprofloxacin (CIPRO) 500 MG tablet Take 1 tablet (500 mg) by mouth 2 times daily 14 tablet 0     drospirenone-ethinyl estradiol (DAVID) 3-0.03 MG tablet Take 1 tablet by mouth daily 84 tablet 3     fluticasone (FLONASE) 50 MCG/ACT nasal spray Spray 1-2  sprays into both nostrils daily 16 g 11     levalbuterol (XOPENEX HFA) 45 MCG/ACT inhaler Inhale 2 puffs into the lungs every 4 hours as needed for shortness of breath / dyspnea or wheezing 2 Inhaler 3     montelukast (SINGULAIR) 10 MG tablet Take 1 tablet (10 mg) by mouth At Bedtime 90 tablet 3          Reviewed and updated as needed this visit by Provider         Review of Systems   ROS COMP: Constitutional, HEENT, cardiovascular, pulmonary, gi and gu systems are negative, except as otherwise noted.      Objective    /82 (BP Location: Right arm, Patient Position: Sitting, Cuff Size: Adult Regular)   Pulse 74   Temp 98.4  F (36.9  C) (Oral)   Wt 77.6 kg (171 lb)   SpO2 98%   BMI 28.46 kg/m    Body mass index is 28.46 kg/m .  Physical Exam   GENERAL: healthy, alert and no distress  RESP: lungs clear to auscultation - no rales, rhonchi or wheezes  CV: regular rate and rhythm  no murmur   ABDOMEN: soft, nontender, no CVA tenderness    Diagnostic Test Results:  No results found for this or any previous visit (from the past 24 hour(s)).        Assessment & Plan      (N30.01) Acute cystitis with hematuria  (primary encounter diagnosis)  Comment: increase water intake  Plan: HCL  Urinalysis, Routine (BFP), ciprofloxacin         (CIPRO) 500 MG tablet, Urine Culture Aerobic         Bacterial              FUTURE APPOINTMENTS:  Call or return to clinic prn if these symtoms worsen, fail to improve as anticipated, or if new symptoms develop.          Mahi Balbuena MD  [facility]

## 2019-06-10 NOTE — NURSING NOTE
Chief Complaint   Patient presents with     UTI     burning with urination, frequency and urgency, has had UTI's before and is having same symptoms     Pre-visit Screening:  Immunizations:  up to date  Colonoscopy:  NA  Mammogram: NA  Asthma Action Test/Plan:  NA  PHQ9:  NA  GAD7:  NA  Questioned patient about current smoking habits Pt. has never smoked.  Ok to leave detailed message on voice mail for today's visit only Yes, phone # [phone]

## 2019-06-13 LAB — URINE VOIDED CULTURE: ABNORMAL

## 2019-12-08 ENCOUNTER — HEALTH MAINTENANCE LETTER (OUTPATIENT)
Age: 34
End: 2019-12-08

## 2020-01-22 DIAGNOSIS — Z30.09 GENERAL COUNSELING FOR PRESCRIPTION OF ORAL CONTRACEPTIVES: ICD-10-CM

## 2020-01-23 RX ORDER — DROSPIRENONE AND ETHINYL ESTRADIOL 0.03MG-3MG
KIT ORAL
Qty: 28 TABLET | Refills: 0 | Status: SHIPPED | OUTPATIENT
Start: 2020-01-23 | End: 2020-02-14

## 2020-01-23 NOTE — TELEPHONE ENCOUNTER
Prudence Feliz is requesting a refill of:    Pending Prescriptions:                       Disp   Refills    LINDEN 3-0.03 MG tablet [Pharmacy Med Name*28 tab*0            Sig: TAKE 1 TABLET BY MOUTH DAILY    Pt last CPX was on 2/11/19, cannot schedule until after.    Please close encounter if RX was sent. Thanks, Jolie

## 2020-02-14 ENCOUNTER — OFFICE VISIT (OUTPATIENT)
Dept: FAMILY MEDICINE | Facility: CLINIC | Age: 35
End: 2020-02-14

## 2020-02-14 ENCOUNTER — TELEPHONE (OUTPATIENT)
Dept: FAMILY MEDICINE | Facility: CLINIC | Age: 35
End: 2020-02-14

## 2020-02-14 VITALS
SYSTOLIC BLOOD PRESSURE: 110 MMHG | TEMPERATURE: 97.3 F | HEIGHT: 66 IN | BODY MASS INDEX: 29.51 KG/M2 | WEIGHT: 183.6 LBS | OXYGEN SATURATION: 98 % | RESPIRATION RATE: 18 BRPM | HEART RATE: 87 BPM | DIASTOLIC BLOOD PRESSURE: 78 MMHG

## 2020-02-14 DIAGNOSIS — J45.20 ASTHMA, INTERMITTENT, UNCOMPLICATED: ICD-10-CM

## 2020-02-14 DIAGNOSIS — Z13.220 SCREENING CHOLESTEROL LEVEL: ICD-10-CM

## 2020-02-14 DIAGNOSIS — F43.0 ACUTE REACTION TO STRESS: ICD-10-CM

## 2020-02-14 DIAGNOSIS — Z30.09 GENERAL COUNSELING FOR PRESCRIPTION OF ORAL CONTRACEPTIVES: ICD-10-CM

## 2020-02-14 DIAGNOSIS — J30.2 OTHER SEASONAL ALLERGIC RHINITIS: ICD-10-CM

## 2020-02-14 DIAGNOSIS — Z13.1 SCREENING FOR DIABETES MELLITUS: ICD-10-CM

## 2020-02-14 DIAGNOSIS — Z01.411 ENCOUNTER FOR GYNECOLOGICAL EXAMINATION WITH ABNORMAL FINDING: Primary | ICD-10-CM

## 2020-02-14 LAB
CHOLEST SERPL-MCNC: 235 MG/DL (ref 0–199)
CHOLEST/HDLC SERPL: 3 {RATIO} (ref 0–5)
GLUCOSE SERPL-MCNC: 87 MG/DL (ref 60–99)
HDLC SERPL-MCNC: 91 MG/DL (ref 40–150)
HEMOGLOBIN: 14.1 G/DL (ref 11.7–15.7)
LDLC SERPL CALC-MCNC: 121 MG/DL (ref 0–130)
TRIGL SERPL-MCNC: 115 MG/DL (ref 0–149)

## 2020-02-14 PROCEDURE — 80061 LIPID PANEL: CPT | Performed by: FAMILY MEDICINE

## 2020-02-14 PROCEDURE — 85018 HEMOGLOBIN: CPT | Performed by: FAMILY MEDICINE

## 2020-02-14 PROCEDURE — 36415 COLL VENOUS BLD VENIPUNCTURE: CPT | Performed by: FAMILY MEDICINE

## 2020-02-14 PROCEDURE — 99395 PREV VISIT EST AGE 18-39: CPT | Performed by: FAMILY MEDICINE

## 2020-02-14 PROCEDURE — 82947 ASSAY GLUCOSE BLOOD QUANT: CPT | Performed by: FAMILY MEDICINE

## 2020-02-14 RX ORDER — LEVALBUTEROL TARTRATE 45 UG/1
2 AEROSOL, METERED ORAL EVERY 4 HOURS PRN
Qty: 2 INHALER | Refills: 3 | Status: SHIPPED | OUTPATIENT
Start: 2020-02-14 | End: 2021-02-16

## 2020-02-14 RX ORDER — FLUTICASONE PROPIONATE 50 MCG
1-2 SPRAY, SUSPENSION (ML) NASAL DAILY
Qty: 16 G | Refills: 11 | Status: SHIPPED | OUTPATIENT
Start: 2020-02-14 | End: 2021-02-16

## 2020-02-14 RX ORDER — MONTELUKAST SODIUM 10 MG/1
10 TABLET ORAL AT BEDTIME
Qty: 90 TABLET | Refills: 3 | Status: SHIPPED | OUTPATIENT
Start: 2020-02-14 | End: 2021-02-16

## 2020-02-14 RX ORDER — DROSPIRENONE AND ETHINYL ESTRADIOL 0.03MG-3MG
1 KIT ORAL DAILY
Qty: 84 TABLET | Refills: 3 | Status: SHIPPED | OUTPATIENT
Start: 2020-02-14 | End: 2021-01-27

## 2020-02-14 SDOH — HEALTH STABILITY: MENTAL HEALTH: HOW OFTEN DO YOU HAVE A DRINK CONTAINING ALCOHOL?: 2-4 TIMES A MONTH

## 2020-02-14 SDOH — HEALTH STABILITY: MENTAL HEALTH: HOW MANY STANDARD DRINKS CONTAINING ALCOHOL DO YOU HAVE ON A TYPICAL DAY?: 1 OR 2

## 2020-02-14 ASSESSMENT — MIFFLIN-ST. JEOR: SCORE: 1551.61

## 2020-02-14 ASSESSMENT — PATIENT HEALTH QUESTIONNAIRE - PHQ9: SUM OF ALL RESPONSES TO PHQ QUESTIONS 1-9: 4

## 2020-02-14 NOTE — NURSING NOTE
Prudence is here fr CPX no pap needed    Pre-visit Screening:  Immunizations:  up to date  Colonoscopy:  NA  Mammogram: NA  Asthma Action Test/Plan:  Done today  PHQ9:  Done today  GAD7:  Done today  Questioned patient about current smoking habits Pt. has never smoked.  Ok to leave detailed message on voice mail for today's visit only Yes, phone # [phone]

## 2020-02-14 NOTE — TELEPHONE ENCOUNTER
Levalbuterol is not coved under her insurance. They will Proair      Please advise if able to change or do a PA for the Levalbuterol    ThanksJolie

## 2020-02-14 NOTE — TELEPHONE ENCOUNTER
Call patient. I think she had a reaction to Proventil/albuterol so needs LEVALBUTEROL/ confirm that please.

## 2020-02-14 NOTE — PROGRESS NOTES
SUBJECTIVE:  Prudence Feliz is an 34 year old G 0 P 0 woman who presents for   annual gyn exam. No LMP recorded. Periods are regular q 28-30 days, lasting   5 days. Dysmenorrhea:none. Cyclic symptoms   include none. No intermenstrual bleeding,   spotting, or discharge.    Current contraception: oral contraceptives  ANABELA exposure: no  History of abnormal Pap smear: No  Family history of uterine or ovarian cancer: No  Regular self breast exam: Yes  History of abnormal mammogram: No  Family history of breast cancer: No  History of abnormal lipids: No    Past Medical History:   Diagnosis Date     Exercise induced bronchospasm        Family History   Problem Relation Age of Onset     Thyroid Disease Mother      C.A.D. No family hx of      Breast Cancer No family hx of      Cancer - colorectal No family hx of        Past Surgical History:   Procedure Laterality Date     C NONSPECIFIC PROCEDURE  1994    urethroscopy       Current Outpatient Medications   Medication     fluticasone (FLONASE) 50 MCG/ACT nasal spray     levalbuterol (XOPENEX HFA) 45 MCG/ACT inhaler     montelukast (SINGULAIR) 10 MG tablet     LINDEN 3-0.03 MG tablet     No current facility-administered medications for this visit.      Allergies   Allergen Reactions     Cefaclor Hives     C-clor       Social History     Tobacco Use     Smoking status: Never Smoker     Smokeless tobacco: Never Used   Substance Use Topics     Alcohol use: Yes     Alcohol/week: 0.8 standard drinks     Types: 1 Standard drinks or equivalent per week     Frequency: 2-4 times a month     Drinks per session: 1 or 2     Comment: once a week       Review Of Systems  Ears/Nose/Throat: spring and fall allergies  Respiratory: No shortness of breath, dyspnea on exertion, cough, or hemoptysis  Cardiovascular: negative  Gastrointestinal: negative  Genitourinary: negative  Psych: father recent car accident with traumatic brain injury/ marked personality change difficult for her  "family    OBJECTIVE:  /78 (BP Location: Right arm, Patient Position: Sitting, Cuff Size: Adult Large)   Pulse 87   Temp 97.3  F (36.3  C) (Oral)   Ht 1.664 m (5' 5.5\")   Wt 83.3 kg (183 lb 9.6 oz)   SpO2 98%   BMI 30.09 kg/m    General appearance: healthy, alert, no distress, cooperative, smiling and over weight  Skin: Skin color, texture, turgor normal. No rashes or lesions.  Ears: negative  Nose/Sinuses: Nares normal. Septum midline. Mucosa normal. No drainage or sinus tenderness.  Oropharynx: Lips, mucosa, and tongue normal. Teeth and gums normal.  Neck: Neck supple. No adenopathy. Thyroid symmetric, normal size,, Carotids without bruits.  Lungs: negative, Percussion normal. Good diaphragmatic excursion. Lungs clear  Heart: negative, PMI normal. No lifts, heaves, or thrills. RRR. No murmurs, clicks gallops or rub  Breasts: Inspection negative. No nipple discharge or bleeding. No masses.  Abdomen: Abdomen soft, non-tender. BS normal. No masses, organomegaly, positive findings: obese  Pelvic: External genitalia and vagina normal. Bimanual and rectovaginal normal.  BMI : Body mass index is 30.09 kg/m .    ASSESSMENT:(Z01.411) Encounter for gynecological examination with abnormal finding  (primary encounter diagnosis)  Plan: CL AFF HEMOGLOBIN (BFP), VENOUS COLLECTION        Exercise encouraged  Fasting lipid profile obtained  Follow up in 1 year  Refills given  Routine breast self-exam encouraged  Seat belt use encouraged      (Z30.09) General counseling for prescription of oral contraceptives  Plan: drospirenone-ethinyl estradiol (LINDEN) 3-0.03         MG tablet        Potential medication side effects were discussed with the patient; let me know if any occur.  Refilled    (J45.20) Asthma, intermittent, uncomplicated  Comment: follow AAP  Plan: montelukast (SINGULAIR) 10 MG tablet,         levalbuterol (XOPENEX HFA) 45 MCG/ACT inhaler,         fluticasone (FLONASE) 50 MCG/ACT nasal spray        Refilled " one year    (J30.2) Other seasonal allergic rhinitis  Plan: montelukast (SINGULAIR) 10 MG tablet,         fluticasone (FLONASE) 50 MCG/ACT nasal spray            (F43.0) Acute reaction to stress  Comment: support  Plan: PSYCHOLOGY REFERRAL            (Z13.1) Screening for diabetes mellitus  Plan: Glucose Fasting (BFP), VENOUS COLLECTION            (Z13.220) Screening cholesterol level  Plan: Lipid Panel (BFP), VENOUS COLLECTION                Dx:  1)  Pap smear  2)  Mammography, lipids at appropriate intervals      PE:  Reviewed health maintenance including diet, regular exercise   and periodic exams.

## 2020-02-14 NOTE — PATIENT INSTRUCTIONS
Exercise encouraged  Fasting lipid profile obtained  Follow up in 1 year  Refills given  Routine breast self-exam encouraged  Seat belt use encouraged

## 2020-02-15 ASSESSMENT — ASTHMA QUESTIONNAIRES: ACT_TOTALSCORE: 24

## 2020-07-14 ENCOUNTER — OFFICE VISIT (OUTPATIENT)
Dept: FAMILY MEDICINE | Facility: CLINIC | Age: 35
End: 2020-07-14

## 2020-07-14 VITALS
RESPIRATION RATE: 20 BRPM | DIASTOLIC BLOOD PRESSURE: 82 MMHG | BODY MASS INDEX: 28.28 KG/M2 | OXYGEN SATURATION: 98 % | HEART RATE: 88 BPM | TEMPERATURE: 98.7 F | WEIGHT: 176 LBS | SYSTOLIC BLOOD PRESSURE: 122 MMHG | HEIGHT: 66 IN

## 2020-07-14 DIAGNOSIS — N30.01 ACUTE CYSTITIS WITH HEMATURIA: ICD-10-CM

## 2020-07-14 DIAGNOSIS — R30.0 DYSURIA: Primary | ICD-10-CM

## 2020-07-14 LAB
ALBUMIN (URINE): ABNORMAL MG/DL
APPEARANCE UR: ABNORMAL
BACTERIA, UR: ABNORMAL
BILIRUB UR QL: ABNORMAL
CASTS/LPF: ABNORMAL
COLOR UR: YELLOW
EP/HPF: ABNORMAL
GLUCOSE URINE: ABNORMAL MG/DL
HGB UR QL: ABNORMAL
KETONES UR QL: ABNORMAL MG/DL
LEUKOCYTE ESTERASE - QUEST: ABNORMAL
MISC.: ABNORMAL
NITRITE UR QL STRIP: ABNORMAL
PH UR STRIP: 6.5 PH (ref 5–7)
RBC, UR MICRO: ABNORMAL (ref ?–2)
SP. GRAVITY: 1.01
UROBILINOGEN UR QL STRIP: 0.2 EU/DL (ref 0.2–1)
WBC, UR MICRO: ABNORMAL (ref ?–2)

## 2020-07-14 PROCEDURE — 87186 SC STD MICRODIL/AGAR DIL: CPT | Mod: 90 | Performed by: PHYSICIAN ASSISTANT

## 2020-07-14 PROCEDURE — 81001 URINALYSIS AUTO W/SCOPE: CPT | Performed by: PHYSICIAN ASSISTANT

## 2020-07-14 PROCEDURE — 99213 OFFICE O/P EST LOW 20 MIN: CPT | Performed by: PHYSICIAN ASSISTANT

## 2020-07-14 PROCEDURE — 87088 URINE BACTERIA CULTURE: CPT | Mod: 90 | Performed by: PHYSICIAN ASSISTANT

## 2020-07-14 PROCEDURE — 87077 CULTURE AEROBIC IDENTIFY: CPT | Mod: 90 | Performed by: PHYSICIAN ASSISTANT

## 2020-07-14 PROCEDURE — 87086 URINE CULTURE/COLONY COUNT: CPT | Mod: 90 | Performed by: PHYSICIAN ASSISTANT

## 2020-07-14 RX ORDER — SULFAMETHOXAZOLE/TRIMETHOPRIM 800-160 MG
1 TABLET ORAL 2 TIMES DAILY
Qty: 14 TABLET | Refills: 0 | Status: SHIPPED | OUTPATIENT
Start: 2020-07-14 | End: 2020-07-21

## 2020-07-14 ASSESSMENT — MIFFLIN-ST. JEOR: SCORE: 1512.14

## 2020-07-14 NOTE — PROGRESS NOTES
"CC: UTI    History:  Karen thought she had UTI 4 weeks ago and did virtual visit and was prescribed Macrobid. This did seem to resolve symptoms temporarily, but then 2 days ago symptoms seemed to worsen. Getting dysuria, urgency, right low back pain, frequent. No fever, sweats, chills, N/V.    PMH, MEDICATIONS, ALLERGIES, SOCIAL AND FAMILY HISTORY in [facility] and reviewed by me personally.    ROS negative other than the symptoms noted above in the HPI.    Examination   /82 (BP Location: Left arm, Patient Position: Sitting, Cuff Size: Adult Regular)   Pulse 88   Temp 98.7  F (37.1  C) (Oral)   Resp 20   Ht 1.664 m (5' 5.5\")   Wt 79.8 kg (176 lb)   LMP 07/08/2020 (Exact Date)   SpO2 98%   BMI 28.84 kg/m       Constitutional: Sitting comfortably, in no acute distress. Vital signs noted  Neck:  no adenopathy, trachea midline and normal to palpation  Cardiovascular:  regular rate and rhythm, no murmurs, clicks, or gallops  Respiratory:  normal respiratory rate and rhythm, lungs clear to auscultation  Abdomen: Abdomen soft, non-tender. BS normal. No masses, organomegaly  M/S: No CVA tenderness  SKIN: No jaundice/pallor/rash.   Psychiatric: mentation appears normal and affect normal/bright        A/P    ICD-10-CM    1. Dysuria  R30.0 HCL  Urinalysis, Routine (BFP)   2. Acute cystitis with hematuria  N30.01 Urine Culture Aerobic Bacterial     sulfamethoxazole-trimethoprim (BACTRIM DS) 800-160 MG tablet       DISCUSSION:  UA today appears to show cystitis. Advised start on 7 day course of Bactrim to take twice daily with food. Warned of sun sensitivity, as well as other side effects. I will order culture, and contact pt with results when available. I will call her if change is indicated.    follow up visit: As needed    Alida Schwarz PA-C  [facility]    "

## 2020-07-14 NOTE — NURSING NOTE
Chief Complaint   Patient presents with     UTI     pain with urination, urgency, frequency, has history of uti's, was just treated for UTI but symptoms never went away fully, took nitrofurantoin     Pre-visit Screening:  Immunizations:  up to date  Colonoscopy:  NA  Mammogram: NA  Asthma Action Test/Plan:  NA  PHQ9:  NA  GAD7:  NA  Questioned patient about current smoking habits Pt. has never smoked.  Ok to leave detailed message on voice mail for today's visit only Yes, phone # [phone]

## 2020-07-17 LAB — URINE VOIDED CULTURE: ABNORMAL

## 2021-01-09 ENCOUNTER — HEALTH MAINTENANCE LETTER (OUTPATIENT)
Age: 36
End: 2021-01-09

## 2021-01-25 ENCOUNTER — MYC MEDICAL ADVICE (OUTPATIENT)
Dept: FAMILY MEDICINE | Facility: CLINIC | Age: 36
End: 2021-01-25

## 2021-01-25 DIAGNOSIS — Z30.09 GENERAL COUNSELING FOR PRESCRIPTION OF ORAL CONTRACEPTIVES: ICD-10-CM

## 2021-01-27 RX ORDER — DROSPIRENONE AND ETHINYL ESTRADIOL 0.03MG-3MG
1 KIT ORAL DAILY
Qty: 28 TABLET | Refills: 0 | COMMUNITY
Start: 2021-01-27 | End: 2021-02-16

## 2021-01-27 NOTE — TELEPHONE ENCOUNTER
Pt called and needs an extension. Called in a month supply of birth control into her pharmacy to get through until her appt.      Signed Prescriptions:                        Disp   Refills    drospirenone-ethinyl estradiol (LINDEN) 3-0*28 tab*0        Sig: Take 1 tablet by mouth daily  Authorizing Provider: JUAN SKY  Ordering User: TAURUS LAFLEUR

## 2021-02-16 ENCOUNTER — TELEPHONE (OUTPATIENT)
Dept: FAMILY MEDICINE | Facility: CLINIC | Age: 36
End: 2021-02-16

## 2021-02-16 ENCOUNTER — OFFICE VISIT (OUTPATIENT)
Dept: FAMILY MEDICINE | Facility: CLINIC | Age: 36
End: 2021-02-16

## 2021-02-16 VITALS
OXYGEN SATURATION: 98 % | BODY MASS INDEX: 30.02 KG/M2 | WEIGHT: 186.8 LBS | DIASTOLIC BLOOD PRESSURE: 82 MMHG | HEIGHT: 66 IN | TEMPERATURE: 98.1 F | RESPIRATION RATE: 20 BRPM | SYSTOLIC BLOOD PRESSURE: 118 MMHG | HEART RATE: 84 BPM

## 2021-02-16 DIAGNOSIS — Z13.1 SCREENING FOR DIABETES MELLITUS: ICD-10-CM

## 2021-02-16 DIAGNOSIS — J45.20 ASTHMA, INTERMITTENT, UNCOMPLICATED: ICD-10-CM

## 2021-02-16 DIAGNOSIS — J45.20 ASTHMA, INTERMITTENT, UNCOMPLICATED: Primary | ICD-10-CM

## 2021-02-16 DIAGNOSIS — Z30.09 GENERAL COUNSELING FOR PRESCRIPTION OF ORAL CONTRACEPTIVES: ICD-10-CM

## 2021-02-16 DIAGNOSIS — J30.2 OTHER SEASONAL ALLERGIC RHINITIS: ICD-10-CM

## 2021-02-16 DIAGNOSIS — Z01.411 ENCOUNTER FOR GYNECOLOGICAL EXAMINATION WITH ABNORMAL FINDING: Primary | ICD-10-CM

## 2021-02-16 DIAGNOSIS — Z13.220 SCREENING CHOLESTEROL LEVEL: ICD-10-CM

## 2021-02-16 LAB
CHOLEST SERPL-MCNC: 229 MG/DL (ref 0–199)
CHOLEST/HDLC SERPL: 3 {RATIO} (ref 0–5)
GLUCOSE SERPL-MCNC: 81 MG/DL (ref 60–99)
HDLC SERPL-MCNC: 85 MG/DL (ref 40–150)
HEMOGLOBIN: 13.8 G/DL (ref 11.7–15.7)
LDLC SERPL CALC-MCNC: 105 MG/DL (ref 0–130)
TRIGL SERPL-MCNC: 193 MG/DL (ref 0–149)

## 2021-02-16 PROCEDURE — 99395 PREV VISIT EST AGE 18-39: CPT | Performed by: FAMILY MEDICINE

## 2021-02-16 PROCEDURE — 82947 ASSAY GLUCOSE BLOOD QUANT: CPT | Performed by: FAMILY MEDICINE

## 2021-02-16 PROCEDURE — 36415 COLL VENOUS BLD VENIPUNCTURE: CPT | Performed by: FAMILY MEDICINE

## 2021-02-16 PROCEDURE — 85018 HEMOGLOBIN: CPT | Performed by: FAMILY MEDICINE

## 2021-02-16 PROCEDURE — 80061 LIPID PANEL: CPT | Performed by: FAMILY MEDICINE

## 2021-02-16 RX ORDER — MONTELUKAST SODIUM 10 MG/1
10 TABLET ORAL AT BEDTIME
Qty: 90 TABLET | Refills: 3 | Status: SHIPPED | OUTPATIENT
Start: 2021-02-16 | End: 2022-02-23

## 2021-02-16 RX ORDER — ALBUTEROL SULFATE 90 UG/1
2 POWDER, METERED RESPIRATORY (INHALATION) EVERY 6 HOURS PRN
Qty: 2 EACH | Refills: 1 | Status: SHIPPED | OUTPATIENT
Start: 2021-02-16 | End: 2022-02-23

## 2021-02-16 RX ORDER — FLUTICASONE PROPIONATE 50 MCG
1-2 SPRAY, SUSPENSION (ML) NASAL DAILY
Qty: 16 G | Refills: 11 | Status: SHIPPED | OUTPATIENT
Start: 2021-02-16

## 2021-02-16 RX ORDER — DROSPIRENONE AND ETHINYL ESTRADIOL 0.03MG-3MG
1 KIT ORAL DAILY
Qty: 84 TABLET | Refills: 3 | Status: SHIPPED | OUTPATIENT
Start: 2021-02-16 | End: 2022-01-10

## 2021-02-16 RX ORDER — LEVALBUTEROL TARTRATE 45 UG/1
2 AEROSOL, METERED ORAL EVERY 4 HOURS PRN
Qty: 30 G | Refills: 3 | Status: SHIPPED | OUTPATIENT
Start: 2021-02-16 | End: 2021-02-16

## 2021-02-16 RX ORDER — ALBUTEROL SULFATE 90 UG/1
2 AEROSOL, METERED RESPIRATORY (INHALATION) EVERY 6 HOURS
Qty: 30 G | Refills: 3 | Status: CANCELLED | OUTPATIENT
Start: 2021-02-16

## 2021-02-16 SDOH — HEALTH STABILITY: MENTAL HEALTH: HOW OFTEN DO YOU HAVE 6 OR MORE DRINKS ON ONE OCCASION?: NOT ASKED

## 2021-02-16 SDOH — HEALTH STABILITY: MENTAL HEALTH: HOW MANY STANDARD DRINKS CONTAINING ALCOHOL DO YOU HAVE ON A TYPICAL DAY?: NOT ASKED

## 2021-02-16 SDOH — HEALTH STABILITY: MENTAL HEALTH: HOW OFTEN DO YOU HAVE A DRINK CONTAINING ALCOHOL?: NOT ASKED

## 2021-02-16 ASSESSMENT — MIFFLIN-ST. JEOR: SCORE: 1569.07

## 2021-02-16 NOTE — PATIENT INSTRUCTIONS
Await labs     Exercise encouraged  Fasting lipid profile obtained  Flu shot recommended  Follow up in 1 year  Glucose obtained  Hemoglobin obtained  Refills given  Routine breast self-exam encouraged  Seat belt use encouraged  Sunscreen recommended  Weight loss recommended

## 2021-02-16 NOTE — TELEPHONE ENCOUNTER
Roderick sent a fax that pt's levalbuterol not covered, need to switch to generic albuterol sulfate. Please send in alternative.    Prudence Feliz is requesting a refill of:    Pending Prescriptions:                       Disp   Refills    albuterol (PROAIR HFA/PROVENTIL HFA/VANCE*30 g   3            Sig: Inhale 2 puffs into the lungs every 6 hours

## 2021-02-16 NOTE — NURSING NOTE
Patient is here for a full physical exam.    Pre-Visit Screening :  Immunizations : up to date  Colon Screening : NA  Mammogram: NA  Asthma Action Test/Plan : Done today  PHQ9 :  PHQ 2 done today  GAD7 :  NA  Patient's  BMI Body mass index is 30.15 kg/m .  Questioned patient about current smoking habits.  Pt. has never smoked.  OK to leave a detailed voice message regarding today's visit Yes, phone # [phone]      ETOH screening: addressed in history

## 2021-02-16 NOTE — PROGRESS NOTES
Multiple issues    1. Physical with fasting labs  2. Oral contraceptive refills  3. Asthma and allergy medication used year around    1. SUBJECTIVE:  Prudence Feliz is an 35 year old G 0 P 0 woman who presents for   annual gyn exam. Patient's last menstrual period was 02/16/2021. Periods are regular q 28-30 days, lasting   5 days. Dysmenorrhea:none. Cyclic symptoms   include none. No intermenstrual bleeding,   spotting, or discharge.    Current contraception: oral contraceptives  ANABELA exposure: no  History of abnormal Pap smear: No/ thin prep 2019  Family history of uterine or ovarian cancer: No  Regular self breast exam: Yes  History of abnormal mammogram: No  Family history of breast cancer: No  History of abnormal lipids: No    Past Medical History:   Diagnosis Date     Asthma, intermittent, uncomplicated 2/8/2017     Exercise induced bronchospasm      Other seasonal allergic rhinitis 2/8/2017       Family History   Problem Relation Age of Onset     Thyroid Disease Mother      C.A.D. No family hx of      Breast Cancer No family hx of      Cancer - colorectal No family hx of        Past Surgical History:   Procedure Laterality Date     [facility] NONSPECIFIC PROCEDURE  1994    urethroscopy       Current Outpatient Medications   Medication     drospirenone-ethinyl estradiol (LINDEN) 3-0.03 MG tablet     fluticasone (FLONASE) 50 MCG/ACT nasal spray     levalbuterol (XOPENEX HFA) 45 MCG/ACT inhaler     montelukast (SINGULAIR) 10 MG tablet     No current facility-administered medications for this visit.      Allergies   Allergen Reactions     Cefaclor Hives     C-clor       Social History     Tobacco Use     Smoking status: Never Smoker     Smokeless tobacco: Never Used   Substance Use Topics     Alcohol use: Not Currently     Alcohol/week: 0.8 standard drinks     Types: 1 Standard drinks or equivalent per week     Comment: once a week       Review Of Systems  Ears/Nose/Throat: allergies year around/ antihistamine,  "nasal spray plus montelukast  Respiratory: uses inhaler before exercise  Cardiovascular: negative  Gastrointestinal: negative  Genitourinary: negative    OBJECTIVE:  /82 (BP Location: Right arm, Patient Position: Sitting, Cuff Size: Adult Large)   Pulse 84   Temp 98.1  F (36.7  C) (Oral)   Ht 1.676 m (5' 6\")   Wt 84.7 kg (186 lb 12.8 oz)   LMP 02/16/2021   SpO2 98%   BMI 30.15 kg/m    General appearance: healthy, alert, no distress, cooperative, smiling and over weight  Skin: Skin color, texture, turgor normal. No rashes or lesions.  Ears: negative  Nose/Sinuses: Nares normal. Septum midline. Mucosa normal. No drainage or sinus tenderness.  Oropharynx: Lips, mucosa, and tongue normal. Teeth and gums normal.  Neck: Neck supple. No adenopathy. Thyroid symmetric, normal size,, Carotids without bruits.  Lungs: negative, Percussion normal. Good diaphragmatic excursion. Lungs clear  Heart: negative, PMI normal. No lifts, heaves, or thrills. RRR. No murmurs, clicks gallops or rub  Breasts: Inspection negative. No nipple discharge or bleeding. No masses.  Abdomen: Abdomen soft, non-tender. BS normal. No masses, organomegaly  Pelvic: Deferred  BMI : Body mass index is 30.15 kg/m .    ASSESSMENT:  (Z01.411) Encounter for gynecological examination with abnormal finding  (primary encounter diagnosis)  Plan: HEMOGLOBIN (BFP), VENOUS COLLECTION        Exercise encouraged  Fasting lipid profile obtained  Flu shot recommended  Follow up in 1 year  Glucose obtained  Hemoglobin obtained  Refills given  Routine breast self-exam encouraged  Seat belt use encouraged  Sunscreen recommended  Weight loss recommended      (Z30.09) General counseling for prescription of oral contraceptives  Plan: drospirenone-ethinyl estradiol (LINDEN) 3-0.03         MG tablet        Potential medication side effects were discussed with the patient; let me know if any occur.      (J45.20) Asthma, intermittent, uncomplicated  Plan: levalbuterol " (XOPENEX HFA) 45 MCG/ACT inhaler,         montelukast (SINGULAIR) 10 MG tablet,         fluticasone (FLONASE) 50 MCG/ACT nasal spray,         Asthma Action Plan (AAP)  Plan:  Flu immunization yearly reviewed  Follow up in 1 year.      (J30.2) Other seasonal allergic rhinitis  Plan: montelukast (SINGULAIR) 10 MG tablet,         fluticasone (FLONASE) 50 MCG/ACT nasal spray            (Z13.1) Screening for diabetes mellitus  Plan: Glucose Fasting (BFP), VENOUS COLLECTION            (Z13.220) Screening cholesterol level  Plan: Lipid Panel (BFP), VENOUS COLLECTION              Dx:  1)  Pap smear  2)  Mammography, lipids at appropriate intervals    PE:  Reviewed health maintenance including diet, regular exercise   and periodic exams.    2.   Prudence Feliz is a 35 year old female who presents to the clinic for recheck on reactive airways disease.    Last exacerbation was 2 years ago.    Overall the patient feels improved with less wheezing and less shortness of breath. Uses her rescue inhaler 2-3 times a week before aerobic exercise/ otherwise well controlled with maintenance medications    Current medications:  Current Outpatient Medications   Medication Sig Dispense Refill     drospirenone-ethinyl estradiol (LINDEN) 3-0.03 MG tablet Take 1 tablet by mouth daily 84 tablet 3     fluticasone (FLONASE) 50 MCG/ACT nasal spray Spray 1-2 sprays into both nostrils daily 16 g 11     levalbuterol (XOPENEX HFA) 45 MCG/ACT inhaler Inhale 2 puffs into the lungs every 4 hours as needed for shortness of breath / dyspnea or wheezing 30 g 3     montelukast (SINGULAIR) 10 MG tablet Take 1 tablet (10 mg) by mouth At Bedtime 90 tablet 3       Past Medical History:  Past Medical History:   Diagnosis Date     Asthma, intermittent, uncomplicated 2/8/2017     Exercise induced bronchospasm      Other seasonal allergic rhinitis 2/8/2017       EXAM:  VITALS: Blood pressure 118/82, pulse 84, temperature 98.1  F (36.7  C), temperature  "source Oral, resp. rate 20, height 1.676 m (5' 6\"), weight 84.7 kg (186 lb 12.8 oz), last menstrual period 02/16/2021, SpO2 98 %, not currently breastfeeding.  RESP: Normal - Clear to auscultation without rales, rhonchi, or wheezing.  CARDIAC: NORMAL - regular rate and rhythm without murmur.  ABDOMEN: Abdomen soft, non-tender. BS normal. No masses, organomegaly    Assessment:  Reactive Airways, Markedly improving      "

## 2021-02-17 ASSESSMENT — ASTHMA QUESTIONNAIRES: ACT_TOTALSCORE: 25

## 2021-05-09 ENCOUNTER — ANCILLARY PROCEDURE (OUTPATIENT)
Dept: GENERAL RADIOLOGY | Facility: CLINIC | Age: 36
End: 2021-05-09
Attending: INTERNAL MEDICINE
Payer: COMMERCIAL

## 2021-05-09 ENCOUNTER — OFFICE VISIT (OUTPATIENT)
Dept: URGENT CARE | Facility: URGENT CARE | Age: 36
End: 2021-05-09
Payer: COMMERCIAL

## 2021-05-09 VITALS
SYSTOLIC BLOOD PRESSURE: 133 MMHG | RESPIRATION RATE: 18 BRPM | WEIGHT: 187 LBS | OXYGEN SATURATION: 100 % | TEMPERATURE: 98.2 F | BODY MASS INDEX: 30.05 KG/M2 | HEIGHT: 66 IN | HEART RATE: 97 BPM | DIASTOLIC BLOOD PRESSURE: 90 MMHG

## 2021-05-09 DIAGNOSIS — S43.492A SPRAIN OF OTHER PART OF LEFT SHOULDER REGION, INITIAL ENCOUNTER: ICD-10-CM

## 2021-05-09 DIAGNOSIS — S99.911A ANKLE INJURY, RIGHT, INITIAL ENCOUNTER: ICD-10-CM

## 2021-05-09 DIAGNOSIS — S93.491A SPRAIN OF ANTERIOR TALOFIBULAR LIGAMENT OF RIGHT ANKLE, INITIAL ENCOUNTER: Primary | ICD-10-CM

## 2021-05-09 DIAGNOSIS — S49.91XA SHOULDER INJURY, RIGHT, INITIAL ENCOUNTER: ICD-10-CM

## 2021-05-09 PROCEDURE — 73610 X-RAY EXAM OF ANKLE: CPT | Mod: RT | Performed by: RADIOLOGY

## 2021-05-09 PROCEDURE — 73030 X-RAY EXAM OF SHOULDER: CPT | Mod: LT | Performed by: RADIOLOGY

## 2021-05-09 PROCEDURE — 99214 OFFICE O/P EST MOD 30 MIN: CPT | Performed by: INTERNAL MEDICINE

## 2021-05-09 ASSESSMENT — MIFFLIN-ST. JEOR: SCORE: 1564.98

## 2021-05-09 NOTE — PATIENT INSTRUCTIONS
Patient Education     Treating Ankle Sprains  Treatment will depend on how bad your sprain is. For a severe sprain, healing may take 3 months or more.  Right after your injury: Use R.I.C.E.    BIG: Rest: At first, keep weight off the ankle as much as you can. You may be given crutches to help you walk without putting weight on the ankle.    BIG: Ice: Put an ice pack on the ankle for 20 minutes. Remove the pack and wait at least 30 minutes. Repeat for up to 3 days. This helps reduce swelling.    BIG: Compression: To reduce swelling and keep the joint stable, you may need to wrap the ankle with an elastic bandage. For more severe sprains, you may need an ankle brace, a boot, or a cast.    BIG: Elevation: To reduce swelling, keep your ankle raised above your heart when you sit or lie down.  Medicine  Your healthcare provider may suggest oral nonsteroidal anti-inflammatory medicine (NSAIDs), such as ibuprofen. This relieves the pain and helps reduce swelling. Be sure to take your medicine as directed.  Exercises    After about 2 to 3 weeks, you may be given exercises to strengthen the ligaments and muscles in the ankle. Doing these exercises will help prevent another ankle sprain. Exercises may include standing on your toes and then on your heels and doing ankle curls.    Sit on the edge of a sturdy table or lie on your back.    Pull your toes toward you. Then point them away from you. Repeat for 2 to 3 minutes.  [employer] last reviewed this educational content on 1/1/2018 2000-2021 [employer]. All rights reserved. This information is not intended as a substitute for professional medical care. Always follow your healthcare professional's instructions.

## 2021-05-09 NOTE — PROGRESS NOTES
"    Assessment & Plan     Sprain of anterior talofibular ligament of right ankle, initial encounter  - Ankle/Foot Bracing Supplies Order for DME - ONLY FOR DME    Sprain of other part of left shoulder region, initial encounter  Conservative management with NSAIDs, stretching exercises, rest and icing. Progress to strengthening exercises as tolerated.     Ankle injury, right, initial encounter  - XR Ankle Right G/E 3 Views    Shoulder injury, right, initial encounter  - XR Shoulder Left G/E 3 Views    Yandel Shelton MD  [facility]    Subjective     HPI   Was out walking last night when she rolled the ankle on the right side.  As she was falling, she landed with the left arm stretched out.  She fell laterally toward the left.  She is noting more pain with lifting the shoulder or elevating the shoulder and has swelling over the collarbone.          Objective    BP (!) 133/90   Pulse 97   Temp 98.2  F (36.8  C)   Resp 18   Ht 1.676 m (5' 6\")   Wt 84.8 kg (187 lb)   SpO2 100%   BMI 30.18 kg/m    Body mass index is 30.18 kg/m .  Physical Exam   GENERAL APPEARANCE: healthy, alert and no distress  MS: there is point tenderness over the distal clavicle shaft and AC joint; no pain with internal rotation/external rotation; there is some pain with abduction and flexion of the shoulder; there is point tenderness of the lateral malleolus; no fifth metatarsal pain and tolerates eversion/inversion/flexion of the ankle; no ecchymosis    Results for orders placed or performed in visit on 05/09/21 (from the past 24 hour(s))   XR Shoulder Left G/E 3 Views    Narrative    XR SHOULDER LEFT G/E 3 VIEWS 5/9/2021 6:12 PM     HISTORY: fall on outstretched arm; point tenderness of the distal  clavicle shaft near the AC joint; evaluate for fracture; Shoulder  injury, right, initial encounter      Impression    IMPRESSION: Negative exam.    MIGUELINA HENRY MD   XR Ankle Right G/E 3 Views    Narrative    XR " ANKLE RIGHT G/E 3 VIEWS 5/9/2021 6:12 PM     HISTORY: point tenderness over the lateral malleolus, evaluate for  fracture; Ankle injury, right, initial encounter      Impression    IMPRESSION: No evidence of acute fracture. Small corticated ossicle at  the tip of the medial malleolus may be related to old injury. The  ankle mortise appears congruent.    MIGUELINA HENRY MD

## 2021-10-23 ENCOUNTER — HEALTH MAINTENANCE LETTER (OUTPATIENT)
Age: 36
End: 2021-10-23

## 2021-12-01 ENCOUNTER — OFFICE VISIT (OUTPATIENT)
Dept: FAMILY MEDICINE | Facility: CLINIC | Age: 36
End: 2021-12-01

## 2021-12-01 VITALS
WEIGHT: 196 LBS | OXYGEN SATURATION: 98 % | HEART RATE: 92 BPM | SYSTOLIC BLOOD PRESSURE: 118 MMHG | DIASTOLIC BLOOD PRESSURE: 80 MMHG | TEMPERATURE: 98.3 F | BODY MASS INDEX: 31.5 KG/M2 | HEIGHT: 66 IN

## 2021-12-01 DIAGNOSIS — N30.01 ACUTE CYSTITIS WITH HEMATURIA: Primary | ICD-10-CM

## 2021-12-01 DIAGNOSIS — N39.0 RECURRENT URINARY TRACT INFECTION: ICD-10-CM

## 2021-12-01 LAB
APPEARANCE UR: ABNORMAL
BACTERIA, UR: ABNORMAL
BILIRUB UR QL: ABNORMAL
CASTS/LPF: 0
COLOR UR: YELLOW
EP/HPF: ABNORMAL
GLUCOSE URINE: ABNORMAL MG/DL
HGB UR QL: ABNORMAL
KETONES UR QL: ABNORMAL MG/DL
MISC.: ABNORMAL
NITRITE UR QL STRIP: ABNORMAL
PH UR STRIP: 5.5 PH (ref 5–7)
PROT UR QL: ABNORMAL MG/DL
RBC, UR MICRO: ABNORMAL (ref ?–2)
SP GR UR STRIP: 1.01 (ref 1–1.03)
UROBILINOGEN UR QL STRIP: 0.2 EU/DL (ref 0.2–1)
WBC #/AREA URNS HPF: ABNORMAL /[HPF]
WBC, UR MICRO: ABNORMAL (ref ?–2)

## 2021-12-01 PROCEDURE — 90471 IMMUNIZATION ADMIN: CPT | Performed by: PHYSICIAN ASSISTANT

## 2021-12-01 PROCEDURE — 81001 URINALYSIS AUTO W/SCOPE: CPT | Performed by: PHYSICIAN ASSISTANT

## 2021-12-01 PROCEDURE — 90686 IIV4 VACC NO PRSV 0.5 ML IM: CPT | Performed by: PHYSICIAN ASSISTANT

## 2021-12-01 PROCEDURE — 99213 OFFICE O/P EST LOW 20 MIN: CPT | Mod: 25 | Performed by: PHYSICIAN ASSISTANT

## 2021-12-01 RX ORDER — SULFAMETHOXAZOLE/TRIMETHOPRIM 800-160 MG
1 TABLET ORAL 2 TIMES DAILY
Qty: 14 TABLET | Refills: 0 | Status: SHIPPED | OUTPATIENT
Start: 2021-12-01 | End: 2021-12-08

## 2021-12-01 ASSESSMENT — MIFFLIN-ST. JEOR: SCORE: 1605.8

## 2021-12-01 NOTE — NURSING NOTE
Chief Complaint   Patient presents with     UTI     burning and urgency to urinate, sx began 2 days agom now has low back pain.         Pre-visit Screening:  Immunizations:  up to date  Colonoscopy:  NA  Mammogram: NA  Asthma Action Test/Plan:  Yes  PHQ9:  NA  GAD7:  NA  Questioned patient about current smoking habits Pt. has never smoked.  Ok to leave detailed message on voice mail for today's visit only Yes, phone # [phone]

## 2021-12-01 NOTE — PROGRESS NOTES
Chief Complaint   Patient presents with     UTI     burning and urgency to urinate, sx began 2 days agom now has low back pain.     SUBJECTIVE  Prudence GÓMEZ Avendanopipocarmenza in for a possible UTI.  Symptoms started 3 day(s) ago. Symptoms include  dysuria, frequency, hesitancy, suprapubic pain and pressure, back pain, fever, chills and voiding in small amounts  She denies nausea and vomiting. She does have a history of uti's.  She denies any unusual vaginal discharge or odor.     She is sexually active.  LMP 11/25/221.     Recent abx use? No  Flank pain?  No    OTC treatment?     Other:  Has been seen 3 times in the past 18 months on [facility] for UTIs, June 2020, Dec 2020, April 2021, May 2021. Did have urology work-up 7 years ago where was recommended to have running script for abx, but not specifically after intercourse. Did have urethra procedure when 10 y/o.     Current Outpatient Medications   Medication     albuterol (PROAIR RESPICLICK) 108 (90 Base) MCG/ACT inhaler     drospirenone-ethinyl estradiol (LINDEN) 3-0.03 MG tablet     fluticasone (FLONASE) 50 MCG/ACT nasal spray     montelukast (SINGULAIR) 10 MG tablet     sulfamethoxazole-trimethoprim (BACTRIM DS) 800-160 MG tablet     No current facility-administered medications for this visit.       Patient Active Problem List    Diagnosis Date Noted     Pharyngitis, acute 01/30/2019     Priority: Medium     Asthma, intermittent, uncomplicated 02/08/2017     Priority: Medium     Other seasonal allergic rhinitis 02/08/2017     Priority: Medium     Health Care Home 02/25/2013     Priority: Medium     State Tier Level:  Tier 1  Status:  N/A  Care Coordinator:  N/A    See Letters for [facility] Care Plan           ACP (advance care planning) 02/25/2013     Priority: Medium       Advance Care Planning 2/3/2016: ACP Review of Chart / Resources Provided:  Reviewed chart for advance care plan.  Prudence Feliz has no plan or code status on file. Discussed available resources  "and provided with information. Confirmed code status reflects current choices pending further ACP discussions.  Confirmed/documented legally designated decision maker(s). Added by Aviva Barragan               Contact dermatitis and other eczema, due to unspecified cause 02/22/2011     Priority: Medium     Obesity 02/22/2011     Priority: Medium     Problem list name updated by automated process. Provider to review         OBJECTIVE:    /80   Pulse 92   Temp 98.3  F (36.8  C) (Temporal)   Ht 1.676 m (5' 6\")   Wt 88.9 kg (196 lb)   SpO2 98%   BMI 31.64 kg/m      Patient is a 36 year old female, in no acute distress. Vitals noted.  Cardiac:  Normal rhythm and rate, no murmurs, rubs or gallops.  Lungs: clear to auscultation bilaterally; no wheezes, crackles or rhonchi  Abdomen:  Bowel sounds normal. Soft,  not distended, no masses, no hepatosplenomegaly, non-tender.  There is not CVA tenderness.    Urinalysis:  RBCs, WBCs, bacteria, nitrates    Culture pending?  Yes      ASSESSMENT:  1. Acute cystitis with hematuria        PLAN:  UA consistent with another UTI. Recommended return to urology for further work-up. Will request records from Dr. Briseno    Rx:  Bactrim. BID 7 days.      Push fluids, frequent voiding, acetominophen/ibuprofen prn as tolerated.  Call or return to clinic prn if these symptoms worsen or fail to improve as anticipated.  Repeat UA indicated: No    Prudence GÓMEZ Feliz understands and agrees with the plan.    Alida Reyes PA-C      "

## 2021-12-02 ENCOUNTER — TRANSFERRED RECORDS (OUTPATIENT)
Dept: FAMILY MEDICINE | Facility: CLINIC | Age: 36
End: 2021-12-02

## 2021-12-03 LAB — URINE VOIDED CULTURE: ABNORMAL

## 2022-01-03 ENCOUNTER — VIRTUAL VISIT (OUTPATIENT)
Dept: UROLOGY | Facility: CLINIC | Age: 37
End: 2022-01-03
Payer: COMMERCIAL

## 2022-01-03 VITALS — HEIGHT: 66 IN | BODY MASS INDEX: 28.93 KG/M2 | WEIGHT: 180 LBS

## 2022-01-03 DIAGNOSIS — N39.0 RECURRENT UTI: Primary | ICD-10-CM

## 2022-01-03 PROCEDURE — 99203 OFFICE O/P NEW LOW 30 MIN: CPT | Mod: 95 | Performed by: PHYSICIAN ASSISTANT

## 2022-01-03 RX ORDER — MULTIVIT WITH MINERALS/LUTEIN
1000 TABLET ORAL 2 TIMES DAILY
Qty: 180 TABLET | Refills: 0 | Status: SHIPPED | OUTPATIENT
Start: 2022-01-03 | End: 2023-03-02

## 2022-01-03 RX ORDER — METHENAMINE HIPPURATE 1000 MG/1
1 TABLET ORAL 2 TIMES DAILY
Qty: 180 TABLET | Refills: 0 | Status: SHIPPED | OUTPATIENT
Start: 2022-01-03 | End: 2023-03-02

## 2022-01-03 ASSESSMENT — ENCOUNTER SYMPTOMS
VOMITING: 0
NAUSEA: 0
DIARRHEA: 0
HEMATURIA: 0
CONSTIPATION: 0
CHILLS: 0
FREQUENCY: 0
FEVER: 0
DYSURIA: 0
SHORTNESS OF BREATH: 0

## 2022-01-03 ASSESSMENT — MIFFLIN-ST. JEOR: SCORE: 1533.22

## 2022-01-03 ASSESSMENT — PAIN SCALES - GENERAL: PAINLEVEL: NO PAIN (0)

## 2022-01-03 NOTE — PATIENT INSTRUCTIONS
1. Urinalysis, urine culture, and post void residual  2. Mycoplasma, Ureaplasma cultures  3. CT scan Please call [phone] to schedule this at [facility] or [facility]. This should be done 1-2 days prior to your appointment with the urologist  4. Cystoscopy to look inside the bladder.  5. Hydrate with water to keep the urine dilute.   6. Lifestyle and hygiene modifications: wiping front to back, wearing cotton breathable underwear, voiding before and after intercourse to flush the urethra, minimizing baths and opting for showers, and appropriate perineal hygiene.   7.  Start Hiprex and Vitamin C. Hiprex 1 g by mouth twice a day along with vitamin-C 1000 mg by mouth twice a day. We discussed the mechanism of action and potential side effects. If the patient has symptoms of a urinary tract infection while taking the medication she should have their urine tested with a urinalysis and urine culture with sensitivities and be treated with the appropriate antibiotic. If an antibiotic is prescribed, Hiprex therapy should stop while on the antibiotic and resume once the antibiotic therapy has been completed.     Treatment failure on Hiprex is not indicated by ian a urinary tract infection. Treatment failure is indicated by ian multiple urinary tract infections per year.   8. Below is a list of things that can irritate the bladder and should try to remove to see if it improves your symptoms:       Caffeinated soft drinks.    Coffee.    Tea.    Chocolate.    Tomato-based foods.    Acidic juices and fruits. (includes cranberry juice)    Alcohol.    Carbonated drinks.    Aspartame/Nutrasweet (artificial sweeteners)    Vitamin C supplements and citrus fruit    Spicy food

## 2022-01-03 NOTE — LETTER
"1/3/2022       RE: Prudence Feliz  [address] [address]     Dear Colleague,    Thank you for referring your patient, Prudence Feliz, to the [facility] [place] at [facility]. Please see a copy of my visit note below.    ** Patient will meet you in My Chart **    Karen is a 36 year old who is being evaluated via a billable video visit.      How would you like to obtain your AVS? MyChart  If the video visit is dropped, the invitation should be resent by: Text to cell phone: [phone]  Will anyone else be joining your video visit? No      Video-Visit Details    Type of service:  Video Visit    Video Start Time: 1331    Video End Time:1345    Originating Location (pt. Location): Home    Distant Location (provider location):  [facility] [place]     Platform used for Video Visit: Mariana     CC: Recurrent UTIs    HPI: Ms. Feliz is a very pleasant 36 year old year old female seen in consultation today for recurrent UTIs.  She is not immune compromised and does not have diabetes.    Typical symptoms when she gets a urinary tract infection include dysuria, frequency, hesitancy, suprapubic pressure, waiting small frequent amounts, and at times fevers and chills.      Patient has been having issues with recurrent urinary tract infections for years.  She was told previously that she had a \"tilted bladder.\".  She did have a urethral surgery.  She was on suppressive low-dose antibiotics for some time after this.  She was 11 when she had the surgery.  No history of nephrolithiasis    She saw Dr. Briseno in 2012.  Ultrasound imaging had no concerning findings.  He did not undergo cystoscopy at that time.  They had discussed possible postcoital antibiotics at that time with 100 mg of Macrobid.    Patient notes that she has had infections in June 2020, December 2020, April 2021, May 2021, and " December 2021.  She typically will get at least for urinary tract infections per year.  She does note over this last year, she did a [facility] appointment for 3 of the infections.  In December, urine culture was obtained and that showed greater than 100,000 CFU per mL of E. coli.    She had pyelonephritis 1 time right before her surgery.  She has never been hospitalized for urinary tract infection.    She notes that there is some correlation with intercourse.  She generally feels like she empties her bladder well.  Denies any current hematuria, dysuria, urgency, frequency, or urinary incontinence.  Nocturia 0-1 time.    She has been wiping front to back and trying to have good hygiene.  She urinates after sexual intercourse.  She is treated with antibiotics and symptoms improved.    The following portions of the patient's history were reviewed and updated as appropriate: allergies, current medications, past family history, past medical history, past social history, past surgical history and problem list.     Review of Systems   Constitutional: Negative for chills and fever.   Respiratory: Negative for shortness of breath.    Cardiovascular: Negative for chest pain.   Gastrointestinal: Negative for constipation, diarrhea, nausea and vomiting.   Genitourinary: Negative for dysuria, frequency, hematuria and urgency.        Patient Active Problem List   Diagnosis     Contact dermatitis and other eczema, due to unspecified cause     Obesity     Health Care Home     ACP (advance care planning)     Asthma, intermittent, uncomplicated     Other seasonal allergic rhinitis     Pharyngitis, acute       Past Medical History:   Diagnosis Date     Asthma, intermittent, uncomplicated 2/8/2017     Exercise induced bronchospasm      Other seasonal allergic rhinitis 2/8/2017     Past Surgical History:   Procedure Laterality Date     BLADDER SURGERY       [facility] NONSPECIFIC PROCEDURE  1994    urethroscopy     Social History:   Never  "smoker.    Family History:  No family history of recurrent or chronic urinary tract infections.     GENERAL PHYSICAL EXAM:   Vitals: Ht 1.676 m (5' 6\")   Wt 81.6 kg (180 lb)   BMI 29.05 kg/m    GENERAL: Healthy, alert and no distress  EYES: Eyes grossly normal to inspection.  No discharge or erythema, or obvious scleral/conjunctival abnormalities.  HENT: Normal cephalic/atraumatic.  External ears, nose and mouth without ulcers or lesions.  No nasal drainage visible.  NECK: No asymmetry, visible masses or scars  RESP: No audible wheeze, cough, or visible cyanosis.  No visible retractions or increased work of breathing.    MS: No gross musculoskeletal defects noted.  Normal range of motion.  No visible edema.  SKIN: Visible skin clear. No significant rash, abnormal pigmentation or lesions.  NEURO: Cranial nerves grossly intact.  Mentation and speech appropriate for age.  PSYCH: Mentation appears normal, affect normal/bright, judgement and insight intact, normal speech and appearance well-groomed.    Urine -   Recent Labs   Lab Test 12/01/21  1233   COLOR Yellow   APPEARANCE Cloudy*   URINEGLC Neg   URINEBILI Neg   URINEKETONE Neg   SG 1.015   UBLD Large*   URINEPH 5.5   UROBILINOGEN 0.2   NITRITE Pos*       ASSESSMENT:   1. Recurrent UTI      Urinary tract infections are 2nd to the common cold and causing symptoms in women. The fact that you have urinary tract infections does not imply that there is anything wrong with you or that you are causing them with bad hygiene. We now know that there is strong family history for urinary tract infections due to some tissue markers allow for adherence of bacteria to the bladder lining. In addition we also know that urinary tract infections tend to cluster together. That means that the most likely time to get one is right after you have cleared one.    We also discussed that we are not using suppressive antibiotics as much as we previously did due to concern for antibacterial " resistance.    We will often do an anatomic evaluation for recurrent urinary tract infections which will typically include CT urogram and cystoscopy (scope with your bladder).  If concern for pyelonephritis, we often will do VCUG.  We discussed the anatomic evaluation often does not find any cause for urinary tract infections.  We may be able to reduce urinary tract infections, but we are unable to cure them.    PLAN:   UA/UC  PVR  Mycoplasma, Ureaplasma  CT Urogram  Cysto with first available urologist  Bladder irritant list provided  Hydrate.  - Lifestyle and hygiene modifications were reviewed today in clinic, including wiping front to back, wearing cotton breathable underwear, voiding before and after intercourse to flush the urethra, minimizing baths and opting for showers, and appropriate perineal hygiene. Push fluids to keep the urine dilute    Start:   Hiprex 1000mg BID  Vitamin C 1000mg BID    We will trial this for 3 months to try to break the cycle of recurrent infections.  We discussed that in a younger patient, I do not like to use this long-term.  If he did not have improvement on this, could consider postcoital antibiotics with 100 mg of Macrobid postcoital.  These are assumed based upon negative anatomic evaluation.    Anita Wood PA-C  [facility]   [phone]

## 2022-01-03 NOTE — PROGRESS NOTES
"** Patient will meet you in My Chart **    Karen is a 36 year old who is being evaluated via a billable video visit.      How would you like to obtain your AVS? MyChart  If the video visit is dropped, the invitation should be resent by: Text to cell phone: [phone]  Will anyone else be joining your video visit? No      Video-Visit Details    Type of service:  Video Visit    Video Start Time: 1331    Video End Time:1345    Originating Location (pt. Location): Home    Distant Location (provider location):  [facility] [place]     Platform used for Video Visit: Mariana     CC: Recurrent UTIs    HPI: Ms. Feliz is a very pleasant 36 year old year old female seen in consultation today for recurrent UTIs.  She is not immune compromised and does not have diabetes.    Typical symptoms when she gets a urinary tract infection include dysuria, frequency, hesitancy, suprapubic pressure, waiting small frequent amounts, and at times fevers and chills.      Patient has been having issues with recurrent urinary tract infections for years.  She was told previously that she had a \"tilted bladder.\".  She did have a urethral surgery.  She was on suppressive low-dose antibiotics for some time after this.  She was 11 when she had the surgery.  No history of nephrolithiasis    She saw Dr. Briseno in 2012.  Ultrasound imaging had no concerning findings.  He did not undergo cystoscopy at that time.  They had discussed possible postcoital antibiotics at that time with 100 mg of Macrobid.    Patient notes that she has had infections in June 2020, December 2020, April 2021, May 2021, and December 2021.  She typically will get at least for urinary tract infections per year.  She does note over this last year, she did a [facility] appointment for 3 of the infections.  In December, urine culture was obtained and that showed greater than 100,000 CFU per mL of E. coli.    She had pyelonephritis 1 time right before " "her surgery.  She has never been hospitalized for urinary tract infection.    She notes that there is some correlation with intercourse.  She generally feels like she empties her bladder well.  Denies any current hematuria, dysuria, urgency, frequency, or urinary incontinence.  Nocturia 0-1 time.    She has been wiping front to back and trying to have good hygiene.  She urinates after sexual intercourse.  She is treated with antibiotics and symptoms improved.    The following portions of the patient's history were reviewed and updated as appropriate: allergies, current medications, past family history, past medical history, past social history, past surgical history and problem list.     Review of Systems   Constitutional: Negative for chills and fever.   Respiratory: Negative for shortness of breath.    Cardiovascular: Negative for chest pain.   Gastrointestinal: Negative for constipation, diarrhea, nausea and vomiting.   Genitourinary: Negative for dysuria, frequency, hematuria and urgency.        Patient Active Problem List   Diagnosis     Contact dermatitis and other eczema, due to unspecified cause     Obesity     Health Care Home     ACP (advance care planning)     Asthma, intermittent, uncomplicated     Other seasonal allergic rhinitis     Pharyngitis, acute       Past Medical History:   Diagnosis Date     Asthma, intermittent, uncomplicated 2/8/2017     Exercise induced bronchospasm      Other seasonal allergic rhinitis 2/8/2017     Past Surgical History:   Procedure Laterality Date     BLADDER SURGERY       ZZC NONSPECIFIC PROCEDURE  1994    urethroscopy     Social History:   Never smoker.    Family History:  No family history of recurrent or chronic urinary tract infections.     GENERAL PHYSICAL EXAM:   Vitals: Ht 1.676 m (5' 6\")   Wt 81.6 kg (180 lb)   BMI 29.05 kg/m    GENERAL: Healthy, alert and no distress  EYES: Eyes grossly normal to inspection.  No discharge or erythema, or obvious " scleral/conjunctival abnormalities.  HENT: Normal cephalic/atraumatic.  External ears, nose and mouth without ulcers or lesions.  No nasal drainage visible.  NECK: No asymmetry, visible masses or scars  RESP: No audible wheeze, cough, or visible cyanosis.  No visible retractions or increased work of breathing.    MS: No gross musculoskeletal defects noted.  Normal range of motion.  No visible edema.  SKIN: Visible skin clear. No significant rash, abnormal pigmentation or lesions.  NEURO: Cranial nerves grossly intact.  Mentation and speech appropriate for age.  PSYCH: Mentation appears normal, affect normal/bright, judgement and insight intact, normal speech and appearance well-groomed.    Urine -   Recent Labs   Lab Test 12/01/21  1233   COLOR Yellow   APPEARANCE Cloudy*   URINEGLC Neg   URINEBILI Neg   URINEKETONE Neg   SG 1.015   UBLD Large*   URINEPH 5.5   UROBILINOGEN 0.2   NITRITE Pos*       ASSESSMENT:   1. Recurrent UTI      Urinary tract infections are 2nd to the common cold and causing symptoms in women. The fact that you have urinary tract infections does not imply that there is anything wrong with you or that you are causing them with bad hygiene. We now know that there is strong family history for urinary tract infections due to some tissue markers allow for adherence of bacteria to the bladder lining. In addition we also know that urinary tract infections tend to cluster together. That means that the most likely time to get one is right after you have cleared one.    We also discussed that we are not using suppressive antibiotics as much as we previously did due to concern for antibacterial resistance.    We will often do an anatomic evaluation for recurrent urinary tract infections which will typically include CT urogram and cystoscopy (scope with your bladder).  If concern for pyelonephritis, we often will do VCUG.  We discussed the anatomic evaluation often does not find any cause for urinary tract  infections.  We may be able to reduce urinary tract infections, but we are unable to cure them.    PLAN:   UA/UC  PVR  Mycoplasma, Ureaplasma  CT Urogram  Cysto with first available urologist  Bladder irritant list provided  Hydrate.  - Lifestyle and hygiene modifications were reviewed today in clinic, including wiping front to back, wearing cotton breathable underwear, voiding before and after intercourse to flush the urethra, minimizing baths and opting for showers, and appropriate perineal hygiene. Push fluids to keep the urine dilute    Start:   Hiprex 1000mg BID  Vitamin C 1000mg BID    We will trial this for 3 months to try to break the cycle of recurrent infections.  We discussed that in a younger patient, I do not like to use this long-term.  If he did not have improvement on this, could consider postcoital antibiotics with 100 mg of Macrobid postcoital.  These are assumed based upon negative anatomic evaluation.    Anita Wood PA-C  [facility]   [phone]

## 2022-01-09 DIAGNOSIS — Z30.09 GENERAL COUNSELING FOR PRESCRIPTION OF ORAL CONTRACEPTIVES: ICD-10-CM

## 2022-01-09 DIAGNOSIS — N39.0 RECURRENT UTI: ICD-10-CM

## 2022-01-10 RX ORDER — DROSPIRENONE AND ETHINYL ESTRADIOL 0.03MG-3MG
1 KIT ORAL DAILY
Qty: 56 TABLET | Refills: 0 | COMMUNITY
Start: 2022-01-10 | End: 2022-02-23

## 2022-01-10 RX ORDER — DROSPIRENONE AND ETHINYL ESTRADIOL 0.03MG-3MG
KIT ORAL
Qty: 28 TABLET | Refills: 0 | COMMUNITY
Start: 2022-01-10 | End: 2022-01-10

## 2022-01-11 NOTE — TELEPHONE ENCOUNTER
Called the pharmacy and informed them to fill a 2 month supply instead of a one month supply as the patient scheduled her physical for 2/23/2022 and needs enough until then.

## 2022-02-17 ENCOUNTER — ALLIED HEALTH/NURSE VISIT (OUTPATIENT)
Dept: UROLOGY | Facility: CLINIC | Age: 37
End: 2022-02-17
Payer: COMMERCIAL

## 2022-02-17 DIAGNOSIS — N39.0 RECURRENT UTI: ICD-10-CM

## 2022-02-17 DIAGNOSIS — N39.0 RECURRENT UTI: Primary | ICD-10-CM

## 2022-02-17 LAB — RESIDUAL VOLUME (RV) (EXTERNAL): 19

## 2022-02-17 PROCEDURE — 51798 US URINE CAPACITY MEASURE: CPT

## 2022-02-17 PROCEDURE — 87086 URINE CULTURE/COLONY COUNT: CPT

## 2022-02-17 PROCEDURE — 87109 MYCOPLASMA: CPT

## 2022-02-17 PROCEDURE — 81003 URINALYSIS AUTO W/O SCOPE: CPT | Mod: QW

## 2022-02-17 PROCEDURE — 87076 CULTURE ANAEROBE IDENT EACH: CPT

## 2022-02-17 NOTE — PROGRESS NOTES
Prudence Feliz comes into clinic today at the request of Anita NIEVES Ordering Provider for labs and PVR  scan2.          This service provided today was under the supervising provider of the day Dr Briseno, who was available if needed.        Patient voided for urine sample sent forr   Ureaplasma/mycoplasma cultures . PVR by bladder scan was 19ml . Will await culture results and call with results   Samina Rose LPN

## 2022-02-18 LAB
ALBUMIN UR-MCNC: NEGATIVE MG/DL
APPEARANCE UR: CLEAR
BACTERIA UR CULT: NORMAL
BILIRUB UR QL STRIP: NEGATIVE
COLOR UR AUTO: YELLOW
GLUCOSE UR STRIP-MCNC: NEGATIVE MG/DL
HGB UR QL STRIP: ABNORMAL
KETONES UR STRIP-MCNC: NEGATIVE MG/DL
LEUKOCYTE ESTERASE UR QL STRIP: NEGATIVE
NITRATE UR QL: NEGATIVE
PH UR STRIP: 5.5 [PH] (ref 5–7)
SP GR UR STRIP: 1.01 (ref 1–1.03)
UROBILINOGEN UR STRIP-ACNC: 0.2 E.U./DL

## 2022-02-23 ENCOUNTER — OFFICE VISIT (OUTPATIENT)
Dept: FAMILY MEDICINE | Facility: CLINIC | Age: 37
End: 2022-02-23

## 2022-02-23 VITALS
TEMPERATURE: 97.1 F | BODY MASS INDEX: 32.14 KG/M2 | WEIGHT: 200 LBS | OXYGEN SATURATION: 99 % | HEART RATE: 84 BPM | HEIGHT: 66 IN | SYSTOLIC BLOOD PRESSURE: 112 MMHG | DIASTOLIC BLOOD PRESSURE: 74 MMHG

## 2022-02-23 DIAGNOSIS — Z30.41 SURVEILLANCE OF CONTRACEPTIVE PILL: ICD-10-CM

## 2022-02-23 DIAGNOSIS — Z12.4 SCREENING FOR CERVICAL CANCER: ICD-10-CM

## 2022-02-23 DIAGNOSIS — Z13.1 SCREENING FOR DIABETES MELLITUS: ICD-10-CM

## 2022-02-23 DIAGNOSIS — Z01.419 ENCOUNTER FOR GYNECOLOGICAL EXAMINATION WITHOUT ABNORMAL FINDING: Primary | ICD-10-CM

## 2022-02-23 DIAGNOSIS — Z13.220 SCREENING FOR LIPID DISORDERS: ICD-10-CM

## 2022-02-23 DIAGNOSIS — J30.2 OTHER SEASONAL ALLERGIC RHINITIS: ICD-10-CM

## 2022-02-23 DIAGNOSIS — J45.20 ASTHMA, INTERMITTENT, UNCOMPLICATED: ICD-10-CM

## 2022-02-23 LAB
CHOLEST SERPL-MCNC: 207 MG/DL (ref 0–199)
CHOLEST/HDLC SERPL: 2 {RATIO} (ref 0–5)
GLUCOSE SERPL-MCNC: 85 MG/DL (ref 60–99)
HDLC SERPL-MCNC: 86 MG/DL (ref 40–150)
LDLC SERPL CALC-MCNC: 94 MG/DL (ref 0–130)
TRIGL SERPL-MCNC: 134 MG/DL (ref 0–149)

## 2022-02-23 PROCEDURE — 99395 PREV VISIT EST AGE 18-39: CPT | Performed by: PHYSICIAN ASSISTANT

## 2022-02-23 PROCEDURE — 36415 COLL VENOUS BLD VENIPUNCTURE: CPT | Performed by: PHYSICIAN ASSISTANT

## 2022-02-23 PROCEDURE — 80061 LIPID PANEL: CPT | Performed by: PHYSICIAN ASSISTANT

## 2022-02-23 PROCEDURE — 82947 ASSAY GLUCOSE BLOOD QUANT: CPT | Performed by: PHYSICIAN ASSISTANT

## 2022-02-23 RX ORDER — ALBUTEROL SULFATE 90 UG/1
1-2 AEROSOL, METERED RESPIRATORY (INHALATION) EVERY 4 HOURS PRN
Qty: 18 G | Refills: 1 | Status: SHIPPED | OUTPATIENT
Start: 2022-02-23 | End: 2023-03-02

## 2022-02-23 RX ORDER — MONTELUKAST SODIUM 10 MG/1
10 TABLET ORAL AT BEDTIME
Qty: 90 TABLET | Refills: 3 | Status: SHIPPED | OUTPATIENT
Start: 2022-02-23 | End: 2023-03-02

## 2022-02-23 RX ORDER — DROSPIRENONE AND ETHINYL ESTRADIOL 0.03MG-3MG
1 KIT ORAL DAILY
Qty: 84 TABLET | Refills: 3 | Status: SHIPPED | OUTPATIENT
Start: 2022-02-23 | End: 2022-12-08

## 2022-02-23 ASSESSMENT — ASTHMA QUESTIONNAIRES: ACT_TOTALSCORE: 25

## 2022-02-23 NOTE — PROGRESS NOTES
Chief Complaint:  Physical Exam    SUBJECTIVE:   Prudence Feliz is a 36 year old female presents for routine health maintenance.    Current concerns: Refill OCP, Singulair. Still uses albuterol on rare occasion. Not needing refills yet.     Menses are regular    Patient's last menstrual period was 02/16/2022.     Was last Pap smear normal: Yes  Due for mammogram:  No    Body mass index is 32.28 kg/m .    Present exercise habits:  1-2 times/week  Present dietary habits:  eats regular meals and follows a balanced nutrition diet    Calcium intake: 3-4 servings daily.   Vit D intake: is taking supplement    Is the patient a smoker? No  If yes, smoking cessation advised and counseling provided.     Cardiovascular risk factors: none    Over the past few weeks, have you felt down or depressed? Little interest or pleasure in doing things? No concerns.    If in a relationship are there any Domestic violence concern: No    Last dental appointment:  this year  Last optical appointment:  this year    Was the patient born between [birth year] and has not had Hep C testing?  No, not applicable    I have reviewed the following histories: Past Medical History, Past Surgical History, Social History, Family History, Problem List, Medication List and Allergies    Past Medical History:   Diagnosis Date     Asthma, intermittent, uncomplicated 2/8/2017     Exercise induced bronchospasm      Other seasonal allergic rhinitis 2/8/2017     Family History   Problem Relation Age of Onset     Thyroid Disease Mother      ALS Father         bulbar, post TBI     Multiple Sclerosis Maternal Grandfather      Cerebrovascular Disease Maternal Grandfather      Diabetes Maternal Grandfather      Diabetes Paternal Grandmother      Parkinsonism Paternal Grandfather      C.A.D. No family hx of      Breast Cancer No family hx of      Cancer - colorectal No family hx of      Social History     Socioeconomic History     Marital status: Single     Spouse  name: Not on file     Number of children: 0     Years of education: Not on file     Highest education level: Not on file   Occupational History     Occupation: student   Tobacco Use     Smoking status: Never Smoker     Smokeless tobacco: Never Used   Substance and Sexual Activity     Alcohol use: Not Currently     Alcohol/week: 0.8 standard drinks     Types: 1 Standard drinks or equivalent per week     Comment: once a week     Drug use: No     Sexual activity: Yes     Partners: Male     Birth control/protection: Pill     Comment: long term bf   Other Topics Concern      Service No     Blood Transfusions No     Caffeine Concern No     Occupational Exposure No     Hobby Hazards No     Sleep Concern No     Stress Concern Not Asked     Weight Concern Yes     Special Diet Not Asked     Back Care Not Asked     Exercise No     Bike Helmet Not Asked     Seat Belt Yes     Self-Exams Not Asked     Parent/sibling w/ CABG, MI or angioplasty before 65F 55M? Not Asked   Social History Narrative     Not on file     Social Determinants of Health     Financial Resource Strain: Not on file   Food Insecurity: Not on file   Transportation Needs: Not on file   Physical Activity: Not on file   Stress: Not on file   Social Connections: Not on file   Intimate Partner Violence: Not on file   Housing Stability: Not on file     Past Surgical History:   Procedure Laterality Date     BLADDER SURGERY      1994     Z NONSPECIFIC PROCEDURE  1994    urethroscopy       ROS:  E/M: NEGATIVE for ear, nose, mouth and throat problems  R: NEGATIVE for significant/chronic cough or SOB  CV: NEGATIVE for chest pain or palpitations  GI: NEGATIVE for abdominal pain, chronic diarrhea or constipation  :  NEGATIVE for dysuria, hematuria or vaginal discharge. No sexual health concerns.       Current Outpatient Medications   Medication     albuterol (PROAIR HFA/PROVENTIL HFA/VENTOLIN HFA) 108 (90 Base) MCG/ACT inhaler     drospirenone-ethinyl estradiol  "(LINDEN) 3-0.03 MG tablet     fluticasone (FLONASE) 50 MCG/ACT nasal spray     montelukast (SINGULAIR) 10 MG tablet     vitamin C (ASCORBIC ACID) 1000 MG TABS     methenamine (HIPREX) 1 g tablet     No current facility-administered medications for this visit.       Patient Active Problem List    Diagnosis Date Noted     Pharyngitis, acute 01/30/2019     Priority: Medium     Asthma, intermittent, uncomplicated 02/08/2017     Priority: Medium     Other seasonal allergic rhinitis 02/08/2017     Priority: Medium     Health Care Home 02/25/2013     Priority: Medium     State Tier Level:  Tier 1  Status:  N/A  Care Coordinator:  N/A    See Letters for H Care Plan           ACP (advance care planning) 02/25/2013     Priority: Medium       Advance Care Planning 2/3/2016: ACP Review of Chart / Resources Provided:  Reviewed chart for advance care plan.  Prudence Feliz has no plan or code status on file. Discussed available resources and provided with information. Confirmed code status reflects current choices pending further ACP discussions.  Confirmed/documented legally designated decision maker(s). Added by Aviva Barragan               Contact dermatitis and other eczema, due to unspecified cause 02/22/2011     Priority: Medium     Obesity 02/22/2011     Priority: Medium     Problem list name updated by automated process. Provider to review           OBJECTIVE:  /74 (BP Location: Left arm, Patient Position: Sitting, Cuff Size: Adult Large)   Pulse 84   Temp 97.1  F (36.2  C) (Temporal)   Ht 1.676 m (5' 6\")   Wt 90.7 kg (200 lb)   LMP 02/16/2022   SpO2 99%   BMI 32.28 kg/m      General: 36 year old female who appears her stated age. Vital signs noted.  Head: Normocephalic  Eyes: pupils equal round reactive to light and accomodation, extra ocular movements intact  Ears: external canals and TMs free of abnormalities  Nose: patent, without mucosal abnormalities  Mouth and throat: without erythema or " lesions of the mucosa  Neck: supple, without adenopathy or thyromegaly  Lungs: clear to auscultation, no wheezing or crackles  Breasts: skin without rash, no dominant mass, no nipple discharge, or axillary adenopathy  Cv: regular rate and rhythm, normal s1 and s2 without murmur or click  Abd: soft, non-tender, no masses, no hepatomegaly or splenomegaly.   (female): normal female external genitalia, normal urethral meatus, vaginal mucosa, normal cervix/adnexa/uterus without masses or discharge  Ms: normal muscle tone & symmetry  Skin: clear to inspection and with no palpable abnormalities.  Neuro: sensation and motor function grossly intact; cranial nerves without obvious abnormalities.    ASSESSMENT/PLAN:    Encounter for gynecological examination without abnormal finding  Karen is doing well today. Will update fasting labs, pap, and send MyChart with results when available. Refillled OCP without change for 1 year. Updated ACT/AAP. Will refill albuterol, montelukast as needed for 1 year.     Surveillance of contraceptive pill  - drospirenone-ethinyl estradiol (LINDEN) 3-0.03 MG tablet; Take 1 tablet by mouth daily    Asthma, intermittent, uncomplicated  - albuterol (PROAIR HFA/PROVENTIL HFA/VENTOLIN HFA) 108 (90 Base) MCG/ACT inhaler; Inhale 1-2 puffs into the lungs every 4 hours as needed for shortness of breath / dyspnea  - montelukast (SINGULAIR) 10 MG tablet; Take 1 tablet (10 mg) by mouth At Bedtime    Other seasonal allergic rhinitis  - montelukast (SINGULAIR) 10 MG tablet; Take 1 tablet (10 mg) by mouth At Bedtime    Screening for cervical cancer  - ThinPrep Pap and HPV (mRNa E6/E7) HPV always run([facility])    Screening for lipid disorders  - Lipid Panel (BFP)  - VENOUS COLLECTION    Screening for diabetes mellitus  - Glucose Fasting (BFP)  - VENOUS COLLECTION     reports that she has never smoked. She has never used smokeless tobacco.    Estimated body mass index is 32.28 kg/m  as calculated from the  "following:    Height as of this encounter: 1.676 m (5' 6\").    Weight as of this encounter: 90.7 kg (200 lb).  Weight management plan: Discussed healthy diet and exercise guidelines    Labs pending:      Fasting glucose      Fasting lipids       Pap smear  Meds Suggested:      Vitamin D       Calcium  Tests Recommended:      Regular Dental Examinations        Eye exam  Behavior Modifications:       Cardiovascular exercise 3 times per week--enough to get your Target Heart rate  Other recommendations:     BMI noted and discussed      Regular breast exam     Encouraged My Chart    Counseling Resources:  ATP IV Guidelines  Pooled Cohorts Equation Calculator  Breast Cancer Risk Calculator  FRAX Risk Assessment  ICSI Preventive Guidelines  Dietary Guidelines for Americans, 2010  [employer] MyPlate        Alida Reyes PA-C  2/23/2022    "

## 2022-02-23 NOTE — LETTER
My Asthma Action Plan    Name: Prudence Feliz   YOB: 1985  Date: 2/23/2022   My doctor: Alida Reyes PA-C   My clinic: [facility]        My Rescue Medicine:   Albuterol inhaler (Proair/Ventolin/Proventil HFA)  2-4 puffs EVERY 4 HOURS as needed. Use a spacer if recommended by your provider.   My Asthma Severity:   Intermittent / Exercise Induced  Know your asthma triggers: pollens             GREEN ZONE   Good Control    I feel good    No cough or wheeze    Can work, sleep and play without asthma symptoms       Take your asthma control medicine every day.     1. If exercise triggers your asthma, take your rescue medication    15 minutes before exercise or sports, and    During exercise if you have asthma symptoms  2. Spacer to use with inhaler: If you have a spacer, make sure to use it with your inhaler             YELLOW ZONE Getting Worse  I have ANY of these:    I do not feel good    Cough or wheeze    Chest feels tight    Wake up at night   1. Keep taking your Green Zone medications  2. Start taking your rescue medicine:    every 20 minutes for up to 1 hour. Then every 4 hours for 24-48 hours.  3. If you stay in the Yellow Zone for more than 12-24 hours, contact your doctor.  4. If you do not return to the Green Zone in 12-24 hours or you get worse, start taking your oral steroid medicine if prescribed by your provider.           RED ZONE Medical Alert - Get Help  I have ANY of these:    I feel awful    Medicine is not helping    Breathing getting harder    Trouble walking or talking    Nose opens wide to breathe       1. Take your rescue medicine NOW  2. If your provider has prescribed an oral steroid medicine, start taking it NOW  3. Call your doctor NOW  4. If you are still in the Red Zone after 20 minutes and you have not reached your doctor:    Take your rescue medicine again and    Call 911 or go to the emergency room right away    See your regular doctor  within 2 weeks of an Emergency Room or Urgent Care visit for follow-up treatment.          Annual Reminders:  Meet with Asthma Educator,  Flu Shot in the Fall, consider Pneumonia Vaccination for patients with asthma (aged 19 and older).    Pharmacy:    [id number] - [place], [place] - [address] [place] [address] [address]    Electronically signed by Alida Reyes PA-C   Date: 02/23/22                    Asthma Triggers  How To Control Things That Make Your Asthma Worse    Triggers are things that make your asthma worse.  Look at the list below to help you find your triggers and   what you can do about them. You can help prevent asthma flare-ups by staying away from your triggers.      Trigger                                                          What you can do   Cigarette Smoke  Tobacco smoke can make asthma worse. Do not allow smoking in your home, car or around you.  Be sure no one smokes at a child s day care or school.  If you smoke, ask your health care provider for ways to help you quit.  Ask family members to quit too.  Ask your health care provider for a referral to Quit Plan to help you quit smoking, or call 4-739-427-PLAN.     Colds, Flu, Bronchitis  These are common triggers of asthma. Wash your hands often.  Don t touch your eyes, nose or mouth.  Get a flu shot every year.     Dust Mites  These are tiny bugs that live in cloth or carpet. They are too small to see. Wash sheets and blankets in hot water every week.   Encase pillows and mattress in dust mite proof covers.  Avoid having carpet if you can. If you have carpet, vacuum weekly.   Use a dust mask and HEPA vacuum.   Pollen and Outdoor Mold  Some people are allergic to trees, grass, or weed pollen, or molds. Try to keep your windows closed.  Limit time out doors when pollen count is high.   Ask you health care provider about taking medicine during allergy season.     Animal Dander  Some  people are allergic to skin flakes, urine or saliva from pets with fur or feathers. Keep pets with fur or feathers out of your home.    If you can t keep the pet outdoors, then keep the pet out of your bedroom.  Keep the bedroom door closed.  Keep pets off cloth furniture and away from stuffed toys.     Mice, Rats, and Cockroaches  Some people are allergic to the waste from these pests.   Cover food and garbage.  Clean up spills and food crumbs.  Store grease in the refrigerator.   Keep food out of the bedroom.   Indoor Mold  This can be a trigger if your home has high moisture. Fix leaking faucets, pipes, or other sources of water.   Clean moldy surfaces.  Dehumidify basement if it is damp and smelly.   Smoke, Strong Odors, and Sprays  These can reduce air quality. Stay away from strong odors and sprays, such as perfume, powder, hair spray, paints, smoke incense, paint, cleaning products, candles and new carpet.   Exercise or Sports  Some people with asthma have this trigger. Be active!  Ask your doctor about taking medicine before sports or exercise to prevent symptoms.    Warm up for 5-10 minutes before and after sports or exercise.     Other Triggers of Asthma  Cold air:  Cover your nose and mouth with a scarf.  Sometimes laughing or crying can be a trigger.  Some medicines and food can trigger asthma.

## 2022-02-23 NOTE — NURSING NOTE
Chief Complaint   Patient presents with     Physical     fasting, update PAP today         Pre-visit Screening:  Immunizations:  up to date  Colonoscopy:  NA  Mammogram: NA  Asthma Action Test/Plan:  NA  PHQ9:  Sees a therapist  GAD7:  Sees a therapist  Questioned patient about current smoking habits Pt. has never smoked.  Ok to leave detailed message on voice mail for today's visit only Yes, phone # [phone]

## 2022-02-25 ENCOUNTER — TELEPHONE (OUTPATIENT)
Dept: FAMILY MEDICINE | Facility: CLINIC | Age: 37
End: 2022-02-25

## 2022-02-25 DIAGNOSIS — B37.31 CANDIDIASIS OF VAGINA: Primary | ICD-10-CM

## 2022-02-25 DIAGNOSIS — Z22.39 CARRIER OF UREAPLASMA UREALYTICUM: Primary | ICD-10-CM

## 2022-02-25 LAB
BACTERIA UR CULT: ABNORMAL
CLINICAL HISTORY - QUEST: NORMAL
COMMENT - QUEST: NORMAL
CYTOTECHNOLOGIST - QUEST: NORMAL
DESCRIPTIVE DIAGNOSIS - QUEST: NORMAL
HPV MRNA E6/E7: NOT DETECTED
INFECTION - QUEST: NORMAL
LAST PAP DX - QUEST: NORMAL
LMP - QUEST: NORMAL
PREV BX DX - QUEST: NORMAL
REVIEW CYTOTECHNOLOGIST - QUEST: NORMAL
SOURCE: NORMAL
STATEMENT OF ADEQUACY - QUEST: NORMAL

## 2022-02-25 RX ORDER — DOXYCYCLINE HYCLATE 100 MG
100 TABLET ORAL 2 TIMES DAILY
Qty: 20 TABLET | Refills: 0 | Status: SHIPPED | OUTPATIENT
Start: 2022-02-25 | End: 2022-03-07

## 2022-02-25 RX ORDER — FLUCONAZOLE 150 MG/1
150 TABLET ORAL ONCE
Qty: 1 TABLET | Refills: 0 | Status: SHIPPED | OUTPATIENT
Start: 2022-02-25 | End: 2022-02-25

## 2022-02-25 NOTE — PROGRESS NOTES
Anita Wood PA-C   2/25/2022  9:11 AM CST Back to Top        Please let her know that her mycoplasma culture is negative, but her Ureaplasma culture is positive.  I would recommend doxycycline 100 mg twice daily for 10 days.  Please warn her that this medication can make her photosensitive.  Additionally, any sexual partner should be tested and treated.  Anita Wood PA-C      I SPOKE TO PT AND LET HER KNOW THE ABOVE INFO.  [facility] [place]  ALSO I TOLD HER TO HAVE HER PARTNER TESTED.  PT UNDERSTOOD.  KINSEY Arroyo CMA

## 2022-03-16 ENCOUNTER — HOSPITAL ENCOUNTER (OUTPATIENT)
Dept: CT IMAGING | Facility: CLINIC | Age: 37
Discharge: HOME | End: 2022-03-16
Attending: PHYSICIAN ASSISTANT | Admitting: PHYSICIAN ASSISTANT
Payer: COMMERCIAL

## 2022-03-16 DIAGNOSIS — N39.0 RECURRENT UTI: ICD-10-CM

## 2022-03-16 PROCEDURE — 250N000009 HC RX 250: Performed by: PHYSICIAN ASSISTANT

## 2022-03-16 PROCEDURE — 74178 CT ABD&PLV WO CNTR FLWD CNTR: CPT

## 2022-03-16 PROCEDURE — 250N000011 HC RX IP 250 OP 636: Performed by: PHYSICIAN ASSISTANT

## 2022-03-16 RX ORDER — IOPAMIDOL 755 MG/ML
500 INJECTION, SOLUTION INTRAVASCULAR ONCE
Status: COMPLETED | OUTPATIENT
Start: 2022-03-16 | End: 2022-03-16

## 2022-03-16 RX ADMIN — IOPAMIDOL 120 ML: 755 INJECTION, SOLUTION INTRAVENOUS at 07:50

## 2022-03-16 RX ADMIN — SODIUM CHLORIDE 95 ML: 9 INJECTION, SOLUTION INTRAVENOUS at 07:50

## 2022-03-23 ENCOUNTER — OFFICE VISIT (OUTPATIENT)
Dept: UROLOGY | Facility: CLINIC | Age: 37
End: 2022-03-23
Payer: COMMERCIAL

## 2022-03-23 VITALS
DIASTOLIC BLOOD PRESSURE: 70 MMHG | HEIGHT: 66 IN | SYSTOLIC BLOOD PRESSURE: 130 MMHG | WEIGHT: 200 LBS | BODY MASS INDEX: 32.14 KG/M2

## 2022-03-23 DIAGNOSIS — N39.0 RECURRENT UTI: Primary | ICD-10-CM

## 2022-03-23 DIAGNOSIS — N32.9 LESION OF BLADDER: ICD-10-CM

## 2022-03-23 LAB
ALBUMIN UR-MCNC: NEGATIVE MG/DL
APPEARANCE UR: CLEAR
BILIRUB UR QL STRIP: NEGATIVE
COLOR UR AUTO: YELLOW
GLUCOSE UR STRIP-MCNC: NEGATIVE MG/DL
HGB UR QL STRIP: ABNORMAL
KETONES UR STRIP-MCNC: NEGATIVE MG/DL
LEUKOCYTE ESTERASE UR QL STRIP: NEGATIVE
NITRATE UR QL: NEGATIVE
PH UR STRIP: 7.5 [PH] (ref 5–7)
SP GR UR STRIP: 1.01 (ref 1–1.03)
UROBILINOGEN UR STRIP-ACNC: 0.2 E.U./DL

## 2022-03-23 PROCEDURE — 52000 CYSTOURETHROSCOPY: CPT | Performed by: UROLOGY

## 2022-03-23 PROCEDURE — 99212 OFFICE O/P EST SF 10 MIN: CPT | Mod: 25 | Performed by: UROLOGY

## 2022-03-23 PROCEDURE — 88112 CYTOPATH CELL ENHANCE TECH: CPT | Performed by: PATHOLOGY

## 2022-03-23 PROCEDURE — 81003 URINALYSIS AUTO W/O SCOPE: CPT | Mod: QW | Performed by: UROLOGY

## 2022-03-23 RX ORDER — LIDOCAINE HYDROCHLORIDE 20 MG/ML
JELLY TOPICAL ONCE
Status: DISCONTINUED | OUTPATIENT
Start: 2022-03-23 | End: 2022-04-04 | Stop reason: HOSPADM

## 2022-03-23 ASSESSMENT — PAIN SCALES - GENERAL: PAINLEVEL: NO PAIN (0)

## 2022-03-23 NOTE — PROGRESS NOTES
"March 23, 2022    Return visit    Patient returns today for follow up.  No UTIs since being on the methenamine/Vitamin C. She denies any changes in her health since last visit.    /70   Ht 1.676 m (5' 6\")   Wt 90.7 kg (200 lb)   BMI 32.28 kg/m    She is comfortable, in no distress, non-labored breathing.  Abdomen is soft, non-tender, non-distended.  Normal external female genitalia.  Negative CST.  Pelvic exam is unremarkable    Cystoscopy Note: After informed consent was obtained patient was prepped and draped in the standard fashion.  The flexible cystoscope was inserted into a normal appearing urethral meatus.  The urothelium was carefully examined and there was some irregularity in the trigone most consistent with squamous metaplasia.  There were no obvious tumors, masses, stones, foreign bodies, or other urothelial abnormalities noted.  Bilateral ureteral orifices were noted in the normal orthotopic position and both effluxed clear urine.  The cystoscope was retroflexed and the bladder neck was unremarkable.  The urethra was carefully examined upon removing the cystoscope and was unremarkable.  Patient tolerated the procedure without complications noted.      A/P: 37 year old F with Randall, cystoscopy with appearance of the trigone most consistent with squamous metaplasia    CMP today to check liver and kidney function after starting on methenamine.  Will stop the methenamine at about 3 months and transition to just vitamin C    RTC 3 months to reassess, sooner if needed    10 minutes were spent today on the date of the encounter in reviewing the EMR, direct patient care, coordination of care and documentation in addition to the cystoscopy procedure    Cami Floyd MD MPH  (she/her/hers)   of Urology  [facility]      CC  Patient Care Team:  Alida Reyes PA-C as PCP - General (Family Medicine)  Alida Reyes PA-C as Assigned PCP  Anita Wood " JAMES Lyons as Assigned Surgical Provider

## 2022-03-23 NOTE — LETTER
"3/23/2022       RE: Prudence Feliz  [address]  [place] [address]     Dear Colleague,    Thank you for referring your patient, Prudence Feliz, to the [facility] [place] [facility]. Please see a copy of my visit note below.    March 23, 2022    Return visit    Patient returns today for follow up.  No UTIs since being on the methenamine/Vitamin C. She denies any changes in her health since last visit.    /70   Ht 1.676 m (5' 6\")   Wt 90.7 kg (200 lb)   BMI 32.28 kg/m    She is comfortable, in no distress, non-labored breathing.  Abdomen is soft, non-tender, non-distended.  Normal external female genitalia.  Negative CST.  Pelvic exam is unremarkable    Cystoscopy Note: After informed consent was obtained patient was prepped and draped in the standard fashion.  The flexible cystoscope was inserted into a normal appearing urethral meatus.  The urothelium was carefully examined and there was some irregularity in the trigone most consistent with squamous metaplasia.  There were no obvious tumors, masses, stones, foreign bodies, or other urothelial abnormalities noted.  Bilateral ureteral orifices were noted in the normal orthotopic position and both effluxed clear urine.  The cystoscope was retroflexed and the bladder neck was unremarkable.  The urethra was carefully examined upon removing the cystoscope and was unremarkable.  Patient tolerated the procedure without complications noted.      A/P: 37 year old F with Randall, cystoscopy with appearance of the trigone most consistent with squamous metaplasia    CMP today to check liver and kidney function after starting on methenamine.  Will stop the methenamine at about 3 months and transition to just vitamin C    RTC 3 months to reassess, sooner if needed    10 minutes were spent today on the date of the encounter in reviewing the EMR, direct patient care, coordination " of care and documentation in addition to the cystoscopy procedure    Cami Floyd MD MPH  (she/her/hers)   of Urology  [facility]      CC  Patient Care Team:  Alida Reyes PA-C as PCP - General (Family Medicine)  Alida Reyes PA-C as Assigned PCP  Anita Wood PA-C as Assigned Surgical Provider

## 2022-03-23 NOTE — PATIENT INSTRUCTIONS
"Please do the blood work    Once you have been on the hipprex for 3 months stop and transition to just the vitamin C    Websites with free information:    American Urogynecologic Society patient website: www.voicesforpfd.org    Total Control Program: www.totalcontrolprogram.com    Supplements to prevent UTI to consider  -Probiotics  -Cranberry (for these products let them know a doctor is recommending them)   Ellura: www.myellura.Desmos   Theracran HP by [employer] [facility] [address]  -d-mannose 2gm daily  -Vitamin C 500-1000mg twice a day    Return to see us in 3 months, sooner if needed    It was a pleasure meeting with you today.  Thank you for allowing me and my team the privilege of caring for you today.  YOU are the reason we are here, and I truly hope we provided you with the excellent service you deserve.  Please let us know if there is anything else we can do for you so that we can be sure you are leaving completely satisfied with your care experience.      AFTER YOUR CYSTOSCOPY  ?  ?  You have just completed a cystoscopy, or \"cysto\", which allowed your physician to learn more about your bladder (or to remove a stent placed after surgery). We suggest that you continue to avoid caffeine, fruit juice, and alcohol for the next 24 hours, however, you are encouraged to return to your normal activities.  ?  ?  A few things that are considered normal after your cystoscopy:  ?  * small amount of bleeding (or spotting) that clears within the next 24 hours  ?  * slight burning sensation with urination  ?  * sensation of needing to void (urinate) more frequently  ?  * the feeling of \"air\" in your urine  ?  * mild discomfort that is relieved with Tylenol    * bladder spasms  ?  ?  ?  Please contact our office promptly if you:  ?  * develop a fever above 101 degrees  ?  * are unable to urinate  ?  * develop bright red blood that does not stop  ?  * experience severe pain or swelling  ?  ?  ?  And of course, please contact our " office with any concerns or questions [phone]  ?

## 2022-03-23 NOTE — NURSING NOTE
Chief Complaint   Patient presents with     Recurrent UTI     Here for a in office cystoscopy     Prior to the start of the procedure and with procedural staff participation, I verbally confirmed the patient s identity using two indicators, relevant allergies, that the procedure was appropriate and matched the consent or emergent situation, and that the correct equipment/implants were available. Immediately prior to starting the procedure I conducted the Time Out with the procedural staff and re-confirmed the patient s name, procedure, and site/side. I have wiped the patient off with the povidone-Iodine solution, draped them,  used Lidocaine hydrochloride jelly, and instilled sterile water into the bladder. (The Joint Commission universal protocol was followed.)  Yes    Sedation (Moderate or Deep): Urojet    5mL 2% lidocaine hydrochloride Urojet instilled into urethra.    NDC# [address]-9  Lot #: [id number]  Expiration Date:  7-22    Elaine Wray

## 2022-03-24 LAB
PATH REPORT.COMMENTS IMP SPEC: NORMAL
PATH REPORT.FINAL DX SPEC: NORMAL
PATH REPORT.GROSS SPEC: NORMAL
PATH REPORT.MICROSCOPIC SPEC OTHER STN: NORMAL
PATH REPORT.RELEVANT HX SPEC: NORMAL

## 2022-03-31 DIAGNOSIS — N39.0 RECURRENT UTI: Primary | ICD-10-CM

## 2022-03-31 LAB
ALBUMIN SERPL-MCNC: 4.6 G/DL (ref 3.6–5.1)
ALBUMIN/GLOB SERPL: 1.6 {RATIO} (ref 1–2.5)
ALP SERPL-CCNC: 40 U/L (ref 33–130)
ALT 1742-6: 18 U/L (ref 0–32)
AST 1920-8: 11 U/L (ref 0–35)
BILIRUB SERPL-MCNC: 0.7 MG/DL (ref 0.2–1.2)
BUN SERPL-MCNC: 17 MG/DL (ref 7–25)
BUN/CREATININE RATIO: 20 (ref 6–22)
CALCIUM SERPL-MCNC: 9.4 MG/DL (ref 8.6–10.3)
CHLORIDE SERPLBLD-SCNC: 106.8 MMOL/L (ref 98–110)
CO2 SERPL-SCNC: 25.1 MMOL/L (ref 20–32)
CREAT SERPL-MCNC: 0.85 MG/DL (ref 0.6–1.3)
GLOBULIN, CALCULATED - QUEST: 2.9 (ref 1.9–3.7)
GLUCOSE SERPL-MCNC: 87 MG/DL (ref 60–99)
POTASSIUM SERPL-SCNC: 4.79 MMOL/L (ref 3.5–5.3)
PROT SERPL-MCNC: 7.5 G/DL (ref 6.1–8.1)
SODIUM SERPL-SCNC: 140.2 MMOL/L (ref 135–146)

## 2022-03-31 PROCEDURE — 80053 COMPREHEN METABOLIC PANEL: CPT | Performed by: PHYSICIAN ASSISTANT

## 2022-03-31 PROCEDURE — 36415 COLL VENOUS BLD VENIPUNCTURE: CPT | Performed by: PHYSICIAN ASSISTANT

## 2022-03-31 NOTE — NURSING NOTE
Pt here for lab only blood draw, order from outside provider in Epic.  Will route results to ordering provider.

## 2022-10-09 ENCOUNTER — HEALTH MAINTENANCE LETTER (OUTPATIENT)
Age: 37
End: 2022-10-09

## 2022-12-08 ENCOUNTER — TELEPHONE (OUTPATIENT)
Dept: FAMILY MEDICINE | Facility: CLINIC | Age: 37
End: 2022-12-08

## 2022-12-08 DIAGNOSIS — Z30.41 SURVEILLANCE OF CONTRACEPTIVE PILL: ICD-10-CM

## 2022-12-08 RX ORDER — DROSPIRENONE AND ETHINYL ESTRADIOL 0.03MG-3MG
1 KIT ORAL DAILY
Qty: 84 TABLET | Refills: 0 | Status: SHIPPED | OUTPATIENT
Start: 2022-12-08 | End: 2023-03-02

## 2022-12-08 NOTE — TELEPHONE ENCOUNTER
Pt is due for annual exam for birth control pills - Can you please send one month supply. I will call the Pt that she need to make an appt.

## 2022-12-08 NOTE — TELEPHONE ENCOUNTER
Pt is due in late Feb for an appt.   Alida, could you send in a 3 month supply?  Pt states that she always runs out in Dec, perhaps next time you could send it in with 4 refills.  She will call back to schedule.    Thanks, Kenya      Pending Prescriptions:                       Disp   Refills    drospirenone-ethinyl estradiol (LINDEN) 3-*84 tab*0            Sig: Take 1 tablet by mouth daily

## 2023-03-02 ENCOUNTER — OFFICE VISIT (OUTPATIENT)
Dept: FAMILY MEDICINE | Facility: CLINIC | Age: 38
End: 2023-03-02

## 2023-03-02 VITALS
OXYGEN SATURATION: 98 % | HEIGHT: 66 IN | TEMPERATURE: 97.8 F | HEART RATE: 88 BPM | WEIGHT: 203 LBS | BODY MASS INDEX: 32.62 KG/M2 | DIASTOLIC BLOOD PRESSURE: 76 MMHG | SYSTOLIC BLOOD PRESSURE: 116 MMHG

## 2023-03-02 DIAGNOSIS — J30.2 OTHER SEASONAL ALLERGIC RHINITIS: ICD-10-CM

## 2023-03-02 DIAGNOSIS — J45.20 ASTHMA, INTERMITTENT, UNCOMPLICATED: ICD-10-CM

## 2023-03-02 DIAGNOSIS — Z13.1 SCREENING FOR DIABETES MELLITUS: ICD-10-CM

## 2023-03-02 DIAGNOSIS — Z13.220 SCREENING FOR LIPID DISORDERS: ICD-10-CM

## 2023-03-02 DIAGNOSIS — Z30.41 SURVEILLANCE OF CONTRACEPTIVE PILL: ICD-10-CM

## 2023-03-02 DIAGNOSIS — N39.0 RECURRENT UTI: ICD-10-CM

## 2023-03-02 DIAGNOSIS — Z00.00 ENCOUNTER FOR GENERAL HEALTH EXAMINATION: Primary | ICD-10-CM

## 2023-03-02 LAB
CHOLEST SERPL-MCNC: 166 MG/DL (ref 0–199)
CHOLEST/HDLC SERPL: 2 {RATIO} (ref 0–5)
GLUCOSE SERPL-MCNC: 95 MG/DL (ref 60–99)
HDLC SERPL-MCNC: 68 MG/DL (ref 40–150)
LDLC SERPL CALC-MCNC: 72 MG/DL (ref 0–130)
TRIGL SERPL-MCNC: 132 MG/DL (ref 0–149)

## 2023-03-02 PROCEDURE — 80061 LIPID PANEL: CPT | Performed by: PHYSICIAN ASSISTANT

## 2023-03-02 PROCEDURE — 36415 COLL VENOUS BLD VENIPUNCTURE: CPT | Performed by: PHYSICIAN ASSISTANT

## 2023-03-02 PROCEDURE — 99395 PREV VISIT EST AGE 18-39: CPT | Performed by: PHYSICIAN ASSISTANT

## 2023-03-02 PROCEDURE — 82947 ASSAY GLUCOSE BLOOD QUANT: CPT | Performed by: PHYSICIAN ASSISTANT

## 2023-03-02 RX ORDER — MULTIVIT WITH MINERALS/LUTEIN
1000 TABLET ORAL 2 TIMES DAILY
Qty: 180 TABLET | Refills: 0 | COMMUNITY
Start: 2023-03-02

## 2023-03-02 RX ORDER — MONTELUKAST SODIUM 10 MG/1
10 TABLET ORAL AT BEDTIME
Qty: 90 TABLET | Refills: 3 | Status: SHIPPED | OUTPATIENT
Start: 2023-03-02 | End: 2024-03-06

## 2023-03-02 RX ORDER — ALBUTEROL SULFATE 90 UG/1
1-2 AEROSOL, METERED RESPIRATORY (INHALATION) EVERY 4 HOURS PRN
Qty: 18 G | Refills: 1 | Status: SHIPPED | OUTPATIENT
Start: 2023-03-02 | End: 2024-03-06

## 2023-03-02 RX ORDER — DROSPIRENONE AND ETHINYL ESTRADIOL 0.03MG-3MG
1 KIT ORAL DAILY
Qty: 84 TABLET | Refills: 4 | Status: SHIPPED | OUTPATIENT
Start: 2023-03-02 | End: 2024-03-06

## 2023-03-02 ASSESSMENT — ASTHMA QUESTIONNAIRES: ACT_TOTALSCORE: 25

## 2023-03-02 NOTE — PROGRESS NOTES
Chief Complaint:  Physical Exam    SUBJECTIVE:   Prudence Feliz is a 37 year old female presents for routine health maintenance.    Current concerns:   Asthma: Well controlled.     Menses are regular    Refills of OCP:  1. Do you or anyone in your family have a history of blood clots? No  2. Do you have a history of migraine with aura?  No  3. Do you smoke?  No  4. Do you have a history of hypertension?  No    Patient's last menstrual period was 02/15/2023.    Was last Pap smear normal: Yes  Due for mammogram:  No    Body mass index is 33.27 kg/m .    Present exercise habits:  3-4 times/week  Present dietary habits:  eats regular meals and follows a balanced nutrition diet    Calcium intake: 3-4 servings daily  Vit D intake: is taking supplement    Is the patient a smoker? No  If yes, smoking cessation advised and counseling provided.     Cardiovascular risk factors: obesity    Over the past few weeks, have you felt down or depressed? Little interest or pleasure in doing things? No concerns. Some stress with work. Continues to therapist.    If in a relationship are there any Domestic violence concern: No    Last dental appointment:  this year  Last optical appointment:  Several years    Was the patient born between [birth year] and has not had Hep C testing?  Patient has already been tested    I have reviewed the following histories: Past Medical History, Past Surgical History, Social History, Family History, Problem List, Medication List and Allergies    Past Medical History:   Diagnosis Date     Asthma, intermittent, uncomplicated 2/8/2017     Exercise induced bronchospasm      Other seasonal allergic rhinitis 2/8/2017     Family History   Problem Relation Age of Onset     Thyroid Disease Mother      Diabetes Mother      ALS Father         bulbar, post TBI     Multiple Sclerosis Maternal Grandfather      Cerebrovascular Disease Maternal Grandfather      Diabetes Maternal Grandfather      Diabetes Paternal  Grandmother      Parkinsonism Paternal Grandfather      C.A.D. No family hx of      Breast Cancer No family hx of      Cancer - colorectal No family hx of      Social History     Socioeconomic History     Marital status: Single     Spouse name: Not on file     Number of children: 0     Years of education: Not on file     Highest education level: Not on file   Occupational History     Occupation: student   Tobacco Use     Smoking status: Never     Smokeless tobacco: Never   Substance and Sexual Activity     Alcohol use: Not Currently     Alcohol/week: 0.8 standard drinks     Types: 1 Standard drinks or equivalent per week     Comment: once a week     Drug use: No     Sexual activity: Yes     Partners: Male     Birth control/protection: Pill     Comment: long term bf   Other Topics Concern      Service No     Blood Transfusions No     Caffeine Concern No     Occupational Exposure No     Hobby Hazards No     Sleep Concern No     Stress Concern Not Asked     Weight Concern Yes     Special Diet Not Asked     Back Care Not Asked     Exercise No     Bike Helmet Not Asked     Seat Belt Yes     Self-Exams Not Asked     Parent/sibling w/ CABG, MI or angioplasty before 65F 55M? Not Asked   Social History Narrative     Not on file     Social Determinants of Health     Financial Resource Strain: Not on file   Food Insecurity: Not on file   Transportation Needs: Not on file   Physical Activity: Not on file   Stress: Not on file   Social Connections: Not on file   Intimate Partner Violence: Not on file   Housing Stability: Not on file     Past Surgical History:   Procedure Laterality Date     BLADDER SURGERY      1994     CYSTOSCOPY       LASIK BILATERAL Bilateral 2017     ZZC NONSPECIFIC PROCEDURE  1994    urethroscopy       ROS:  E/M: NEGATIVE for ear, nose, mouth and throat problems  R: NEGATIVE for significant/chronic cough or SOB  CV: NEGATIVE for chest pain or palpitations  GI: NEGATIVE for abdominal pain, chronic  "diarrhea or constipation  :  NEGATIVE for dysuria, hematuria or vaginal discharge. No sexual health concerns.       Current Outpatient Medications   Medication     albuterol (PROAIR HFA/PROVENTIL HFA/VENTOLIN HFA) 108 (90 Base) MCG/ACT inhaler     drospirenone-ethinyl estradiol (LINDEN) 3-0.03 MG tablet     fluticasone (FLONASE) 50 MCG/ACT nasal spray     montelukast (SINGULAIR) 10 MG tablet     vitamin C (ASCORBIC ACID) 1000 MG TABS     No current facility-administered medications for this visit.       Patient Active Problem List    Diagnosis Date Noted     Pharyngitis, acute 01/30/2019     Priority: Medium     Asthma, intermittent, uncomplicated 02/08/2017     Priority: Medium     Other seasonal allergic rhinitis 02/08/2017     Priority: Medium     Health Care Home 02/25/2013     Priority: Medium     State Tier Level:  Tier 1  Status:  N/A  Care Coordinator:  N/A    See Letters for H Care Plan           ACP (advance care planning) 02/25/2013     Priority: Medium       Advance Care Planning 2/3/2016: ACP Review of Chart / Resources Provided:  Reviewed chart for advance care plan.  Prudence MAGAÑA Tray has no plan or code status on file. Discussed available resources and provided with information. Confirmed code status reflects current choices pending further ACP discussions.  Confirmed/documented legally designated decision maker(s). Added by Aviva Barragan               Contact dermatitis and other eczema, due to unspecified cause 02/22/2011     Priority: Medium     Obesity 02/22/2011     Priority: Medium     Problem list name updated by automated process. Provider to review         OBJECTIVE:  /76 (BP Location: Left arm, Patient Position: Sitting, Cuff Size: Adult Large)   Pulse 88   Temp 97.8  F (36.6  C) (Temporal)   Ht 1.664 m (5' 5.5\")   Wt 92.1 kg (203 lb)   LMP 02/15/2023   SpO2 98%   BMI 33.27 kg/m        General: 37 year old female who appears her stated age. Vital signs noted.  Head: " Normocephalic  Eyes: pupils equal round reactive to light and accomodation, extra ocular movements intact  Ears: external canals and TMs free of abnormalities  Nose: patent, without mucosal abnormalities  Mouth and throat: without erythema or lesions of the mucosa  Neck: supple, without adenopathy or thyromegaly  Lungs: clear to auscultation, no wheezing or crackles  Breasts:  Pt declined. No concerns.  Cv: regular rate and rhythm, normal s1 and s2 without murmur or click  Abd: soft, non-tender, no masses, no hepatomegaly or splenomegaly.   (female): Pt declined. No concerns.  Ms: normal muscle tone & symmetry  Skin: clear to inspection and with no palpable abnormalities.  Neuro: sensation and motor function grossly intact; cranial nerves without obvious abnormalities.    ASSESSMENT/PLAN:    Encounter for general health examination  Karen is doing today. Will update fasting labs and send MyChart when available. Refilled mediation for 1 year.     Asthma, intermittent, uncomplicated  - Asthma Action Plan (AAP)  - albuterol (PROAIR HFA/PROVENTIL HFA/VENTOLIN HFA) 108 (90 Base) MCG/ACT inhaler; Inhale 1-2 puffs into the lungs every 4 hours as needed for shortness of breath  - montelukast (SINGULAIR) 10 MG tablet; Take 1 tablet (10 mg) by mouth At Bedtime    Surveillance of contraceptive pill  - drospirenone-ethinyl estradiol (LINDEN) 3-0.03 MG tablet; Take 1 tablet by mouth daily    Other seasonal allergic rhinitis  - montelukast (SINGULAIR) 10 MG tablet; Take 1 tablet (10 mg) by mouth At Bedtime    Recurrent UTI  - vitamin C (ASCORBIC ACID) 1000 MG TABS; Take 1 tablet (1,000 mg) by mouth 2 times daily    Screening for lipid disorders  - VENOUS COLLECTION  - Lipid Panel (BFP)    Screening for diabetes mellitus  - VENOUS COLLECTION  - Glucose Fasting (BFP)     reports that she has never smoked. She has never used smokeless tobacco.      Estimated body mass index is 33.27 kg/m  as calculated from the following:    Height  "as of this encounter: 1.664 m (5' 5.5\").    Weight as of this encounter: 92.1 kg (203 lb).  Weight management plan: Discussed healthy diet and exercise guidelines      Labs pending:      Fasting glucose      Fasting lipids  Meds Suggested:      Vitamin D       Calcium  Tests Recommended:      Regular Dental Examinations   Behavior Modifications:       Cardiovascular exercise 3 times per week--enough to get your Target Heart rate  Other recommendations:     BMI noted and discussed      Regular breast exam     Encouraged My Chart    Counseling Resources:  ATP IV Guidelines  Pooled Cohorts Equation Calculator  Breast Cancer Risk Calculator  FRAX Risk Assessment  ICSI Preventive Guidelines  Dietary Guidelines for Americans, 2010  USDA's MyPlate            Alida Reyes PA-C  3/2/2023    "

## 2023-03-02 NOTE — NURSING NOTE
Chief Complaint   Patient presents with     Physical     fasting         Pre-visit Screening:  Immunizations:  up to date  Colonoscopy:  NA  Mammogram: NA  Asthma Action Test/Plan:  Yes  PHQ9:  NA pt sees a therapist  GAD7:  NA  Questioned patient about current smoking habits Pt. has never smoked.  Ok to leave detailed message on voice mail for today's visit only Yes, phone # [phone]

## 2023-03-02 NOTE — LETTER
My Asthma Action Plan    Name: Prudence Feliz   YOB: 1985  Date: 3/2/2023   My doctor: Alida Reyes PA-C   My clinic: [facility]        My Control Medicine: Montelukast (Singulair) -  10 mg daily  My Rescue Medicine: Albuterol (Proair/Ventolin/Proventil HFA) 2-4 puffs EVERY 4 HOURS as needed. Use a spacer if recommended by your provider.  My Oral Steroid Medicine: None My Asthma Severity:   Mild Persistent  Know your asthma triggers: exercise or sports               GREEN ZONE   Good Control    I feel good    No cough or wheeze    Can work, sleep and play without asthma symptoms       Take your asthma control medicine every day.     1. If exercise triggers your asthma, take your rescue medication    15 minutes before exercise or sports, and    During exercise if you have asthma symptoms  2. Spacer to use with inhaler: If you have a spacer, make sure to use it with your inhaler             YELLOW ZONE Getting Worse  I have ANY of these:    I do not feel good    Cough or wheeze    Chest feels tight    Wake up at night   1. Keep taking your Green Zone medications  2. Start taking your rescue medicine:    every 20 minutes for up to 1 hour. Then every 4 hours for 24-48 hours.  3. If you stay in the Yellow Zone for more than 12-24 hours, contact your doctor.  4. If you do not return to the Green Zone in 12-24 hours or you get worse, start taking your oral steroid medicine if prescribed by your provider.           RED ZONE Medical Alert - Get Help  I have ANY of these:    I feel awful    Medicine is not helping    Breathing getting harder    Trouble walking or talking    Nose opens wide to breathe       1. Take your rescue medicine NOW  2. If your provider has prescribed an oral steroid medicine, start taking it NOW  3. Call your doctor NOW  4. If you are still in the Red Zone after 20 minutes and you have not reached your doctor:    Take your rescue medicine again  and    Call 911 or go to the emergency room right away    See your regular doctor within 2 weeks of an Emergency Room or Urgent Care visit for follow-up treatment.          Annual Reminders:  Meet with Asthma Educator,  Flu Shot in the Fall, consider Pneumonia Vaccination for patients with asthma (aged 19 and older).    Pharmacy:    [id number] - [place], [place] - [address] AT [place] [address] & [address]    Electronically signed by Alida Reyes PA-C   Date: 03/02/23                      Asthma Triggers  How To Control Things That Make Your Asthma Worse    Triggers are things that make your asthma worse.  Look at the list below to help you find your triggers and what you can do about them.  You can help prevent asthma flare-ups by staying away from your triggers.      Trigger                                                          What you can do   Cigarette Smoke  Tobacco smoke can make asthma worse. Do not allow smoking in your home, car or around you.  Be sure no one smokes at a child s day care or school.  If you smoke, ask your health care provider for ways to help you quit.  Ask family members to quit too.  Ask your health care provider for a referral to Quit Plan to help you quit smoking, or call 2-272-546-PLAN.     Colds, Flu, Bronchitis  These are common triggers of asthma. Wash your hands often.  Don t touch your eyes, nose or mouth.  Get a flu shot every year.     Dust Mites  These are tiny bugs that live in cloth or carpet. They are too small to see. Wash sheets and blankets in hot water every week.   Encase pillows and mattress in dust mite proof covers.  Avoid having carpet if you can. If you have carpet, vacuum weekly.   Use a dust mask and HEPA vacuum.   Pollen and Outdoor Mold  Some people are allergic to trees, grass, or weed pollen, or molds. Try to keep your windows closed.  Limit time out doors when pollen count is high.   Ask you health  care provider about taking medicine during allergy season.     Animal Dander  Some people are allergic to skin flakes, urine or saliva from pets with fur or feathers. Keep pets with fur or feathers out of your home.    If you can t keep the pet outdoors, then keep the pet out of your bedroom.  Keep the bedroom door closed.  Keep pets off cloth furniture and away from stuffed toys.     Mice, Rats, and Cockroaches   Some people are allergic to the waste from these pests.   Cover food and garbage.  Clean up spills and food crumbs.  Store grease in the refrigerator.   Keep food out of the bedroom.   Indoor Mold  This can be a trigger if your home has high moisture. Fix leaking faucets, pipes, or other sources of water.   Clean moldy surfaces.  Dehumidify basement if it is damp and smelly.   Smoke, Strong Odors, and Sprays  These can reduce air quality. Stay away from strong odors and sprays, such as perfume, powder, hair spray, paints, smoke incense, paint, cleaning products, candles and new carpet.   Exercise or Sports  Some people with asthma have this trigger. Be active!  Ask your doctor about taking medicine before sports or exercise to prevent symptoms.    Warm up for 5-10 minutes before and after sports or exercise.     Other Triggers of Asthma  Cold air:  Cover your nose and mouth with a scarf.  Sometimes laughing or crying can be a trigger.  Some medicines and food can trigger asthma.

## 2024-03-06 ENCOUNTER — OFFICE VISIT (OUTPATIENT)
Dept: FAMILY MEDICINE | Facility: CLINIC | Age: 39
End: 2024-03-06

## 2024-03-06 VITALS
TEMPERATURE: 98 F | OXYGEN SATURATION: 98 % | DIASTOLIC BLOOD PRESSURE: 72 MMHG | HEART RATE: 81 BPM | SYSTOLIC BLOOD PRESSURE: 110 MMHG | WEIGHT: 215 LBS | BODY MASS INDEX: 34.55 KG/M2 | HEIGHT: 66 IN

## 2024-03-06 DIAGNOSIS — F33.0 MILD RECURRENT MAJOR DEPRESSION (H): ICD-10-CM

## 2024-03-06 DIAGNOSIS — Z13.0 SCREENING FOR BLOOD DISEASE: ICD-10-CM

## 2024-03-06 DIAGNOSIS — Z13.220 SCREENING FOR LIPID DISORDERS: ICD-10-CM

## 2024-03-06 DIAGNOSIS — Z30.41 SURVEILLANCE OF CONTRACEPTIVE PILL: ICD-10-CM

## 2024-03-06 DIAGNOSIS — R63.5 WEIGHT GAIN: ICD-10-CM

## 2024-03-06 DIAGNOSIS — F43.21 GRIEVING: ICD-10-CM

## 2024-03-06 DIAGNOSIS — M25.561 CHRONIC PAIN OF RIGHT KNEE: ICD-10-CM

## 2024-03-06 DIAGNOSIS — J30.2 OTHER SEASONAL ALLERGIC RHINITIS: ICD-10-CM

## 2024-03-06 DIAGNOSIS — Z13.228 SCREENING FOR METABOLIC DISORDER: ICD-10-CM

## 2024-03-06 DIAGNOSIS — G89.29 CHRONIC PAIN OF RIGHT KNEE: ICD-10-CM

## 2024-03-06 DIAGNOSIS — Z00.00 ENCOUNTER FOR GENERAL MEDICAL EXAMINATION: Primary | ICD-10-CM

## 2024-03-06 DIAGNOSIS — J45.20 ASTHMA, INTERMITTENT, UNCOMPLICATED: ICD-10-CM

## 2024-03-06 LAB
BUN SERPL-MCNC: 18 MG/DL (ref 7–25)
BUN/CREATININE RATIO: 22.5 (ref 6–32)
CALCIUM SERPL-MCNC: 9.3 MG/DL (ref 8.6–10.3)
CHLORIDE SERPLBLD-SCNC: 108.4 MMOL/L (ref 98–110)
CHOLEST SERPL-MCNC: 256 MG/DL (ref 0–199)
CHOLEST/HDLC SERPL: 3 {RATIO} (ref 0–5)
CO2 SERPL-SCNC: 22.5 MMOL/L (ref 20–32)
CREAT SERPL-MCNC: 0.8 MG/DL (ref 0.6–1.3)
ERYTHROCYTE [DISTWIDTH] IN BLOOD BY AUTOMATED COUNT: 11.9 %
GLUCOSE SERPL-MCNC: 81 MG/DL (ref 60–99)
HCT VFR BLD AUTO: 45.3 % (ref 35–47)
HDLC SERPL-MCNC: 86 MG/DL (ref 40–150)
HEMOGLOBIN: 14.5 G/DL (ref 11.7–15.7)
LDLC SERPL CALC-MCNC: 153 MG/DL (ref 0–130)
MCH RBC QN AUTO: 29.1 PG (ref 26–33)
MCHC RBC AUTO-ENTMCNC: 32 G/DL (ref 31–36)
MCV RBC AUTO: 90.8 FL (ref 78–100)
PLATELET COUNT - QUEST: 309 10^9/L (ref 150–375)
POTASSIUM SERPL-SCNC: 4.87 MMOL/L (ref 3.5–5.3)
RBC # BLD AUTO: 4.99 10*12/L (ref 3.8–5.2)
SODIUM SERPL-SCNC: 141.8 MMOL/L (ref 135–146)
TRIGL SERPL-MCNC: 84 MG/DL (ref 0–149)
WBC # BLD AUTO: 8.1 10*9/L (ref 4–11)

## 2024-03-06 PROCEDURE — 36415 COLL VENOUS BLD VENIPUNCTURE: CPT | Performed by: PHYSICIAN ASSISTANT

## 2024-03-06 PROCEDURE — 80061 LIPID PANEL: CPT | Performed by: PHYSICIAN ASSISTANT

## 2024-03-06 PROCEDURE — 85027 COMPLETE CBC AUTOMATED: CPT | Performed by: PHYSICIAN ASSISTANT

## 2024-03-06 PROCEDURE — 84443 ASSAY THYROID STIM HORMONE: CPT | Mod: 90 | Performed by: PHYSICIAN ASSISTANT

## 2024-03-06 PROCEDURE — 99395 PREV VISIT EST AGE 18-39: CPT | Performed by: PHYSICIAN ASSISTANT

## 2024-03-06 PROCEDURE — 99213 OFFICE O/P EST LOW 20 MIN: CPT | Mod: 25 | Performed by: PHYSICIAN ASSISTANT

## 2024-03-06 PROCEDURE — 80048 BASIC METABOLIC PNL TOTAL CA: CPT | Performed by: PHYSICIAN ASSISTANT

## 2024-03-06 RX ORDER — MONTELUKAST SODIUM 10 MG/1
10 TABLET ORAL AT BEDTIME
Qty: 90 TABLET | Refills: 3 | Status: SHIPPED | OUTPATIENT
Start: 2024-03-06

## 2024-03-06 RX ORDER — ALBUTEROL SULFATE 90 UG/1
1-2 AEROSOL, METERED RESPIRATORY (INHALATION) EVERY 4 HOURS PRN
Qty: 18 G | Refills: 1 | Status: SHIPPED | OUTPATIENT
Start: 2024-03-06

## 2024-03-06 RX ORDER — BUPROPION HYDROCHLORIDE 150 MG/1
150 TABLET ORAL EVERY MORNING
Qty: 30 TABLET | Refills: 1 | Status: SHIPPED | OUTPATIENT
Start: 2024-03-06 | End: 2024-04-26

## 2024-03-06 RX ORDER — DROSPIRENONE AND ETHINYL ESTRADIOL 0.03MG-3MG
1 KIT ORAL DAILY
Qty: 84 TABLET | Refills: 4 | Status: SHIPPED | OUTPATIENT
Start: 2024-03-06

## 2024-03-06 ASSESSMENT — ANXIETY QUESTIONNAIRES
1. FEELING NERVOUS, ANXIOUS, OR ON EDGE: NOT AT ALL
6. BECOMING EASILY ANNOYED OR IRRITABLE: SEVERAL DAYS
GAD7 TOTAL SCORE: 5
2. NOT BEING ABLE TO STOP OR CONTROL WORRYING: SEVERAL DAYS
IF YOU CHECKED OFF ANY PROBLEMS ON THIS QUESTIONNAIRE, HOW DIFFICULT HAVE THESE PROBLEMS MADE IT FOR YOU TO DO YOUR WORK, TAKE CARE OF THINGS AT HOME, OR GET ALONG WITH OTHER PEOPLE: SOMEWHAT DIFFICULT
5. BEING SO RESTLESS THAT IT IS HARD TO SIT STILL: NOT AT ALL
GAD7 TOTAL SCORE: 5
3. WORRYING TOO MUCH ABOUT DIFFERENT THINGS: MORE THAN HALF THE DAYS
7. FEELING AFRAID AS IF SOMETHING AWFUL MIGHT HAPPEN: NOT AT ALL

## 2024-03-06 ASSESSMENT — PATIENT HEALTH QUESTIONNAIRE - PHQ9
5. POOR APPETITE OR OVEREATING: SEVERAL DAYS
SUM OF ALL RESPONSES TO PHQ QUESTIONS 1-9: 8

## 2024-03-06 ASSESSMENT — ASTHMA QUESTIONNAIRES: ACT_TOTALSCORE: 25

## 2024-03-06 NOTE — PROGRESS NOTES
Chief Complaint:  Physical Exam    SUBJECTIVE:   Prudence Feliz is a 38 year old female presents for routine health maintenance.    Current concerns: Knee pain as well low mood as overeating/weight gain. Medication check. Pt aware of split billing. See separate note.    Refills of OCP:  1. Do you or anyone in your family have a history of blood clots? No  2. Do you have a history of migraine with aura?  No  3. Do you smoke?  No  4. Do you have a history of hypertension?  No    Menses are regular    Patient's last menstrual period was 02/19/2024 (approximate).    Was last Pap smear normal: No  Due for mammogram:  Yes    Body mass index is 35.23 kg/m .    Present exercise habits:  Walking 45 minutes daily with dogs.   Present dietary habits:  eats foods high in fat (snacking) and eats regular meals    Calcium intake:  Diet. No supplement.   Vit D intake: is not taking supplement    Is the patient a smoker? No  If yes, smoking cessation advised and counseling provided.     Cardiovascular risk factors: none    Over the past few weeks, have you felt down or depressed? Little interest or pleasure in doing things? See separate note.     If in a relationship are there any Domestic violence concern: No    Last dental appointment:  last year  Last optical appointment:  last year    Was the patient born between [birth year] and has not had Hep C testing?  No, not applicable    I have reviewed the following histories: Past Medical History, Past Surgical History, Social History, Family History, Problem List, Medication List and Allergies    Past Medical History:   Diagnosis Date    Asthma, intermittent, uncomplicated 2/8/2017    Exercise induced bronchospasm     Other seasonal allergic rhinitis 2/8/2017     Family History   Problem Relation Age of Onset    Thyroid Disease Mother     Diabetes Mother     ALS Father         bulbar, post TBI    Multiple Sclerosis Maternal Grandfather     Cerebrovascular Disease Maternal  Grandfather     Diabetes Maternal Grandfather     Diabetes Paternal Grandmother     Parkinsonism Paternal Grandfather     C.A.D. No family hx of     Breast Cancer No family hx of     Cancer - colorectal No family hx of      Social History     Socioeconomic History    Marital status: Single     Spouse name: Not on file    Number of children: 0    Years of education: Not on file    Highest education level: Not on file   Occupational History    Occupation: student   Tobacco Use    Smoking status: Never     Passive exposure: Never    Smokeless tobacco: Never   Substance and Sexual Activity    Alcohol use: Not Currently     Alcohol/week: 0.0 - 1.0 standard drinks of alcohol    Drug use: No    Sexual activity: Yes     Partners: Male     Birth control/protection: Pill     Comment: long term bf   Other Topics Concern     Service No    Blood Transfusions No    Caffeine Concern No    Occupational Exposure No    Hobby Hazards No    Sleep Concern No    Stress Concern Not Asked    Weight Concern Yes    Special Diet Not Asked    Back Care Not Asked    Exercise No    Bike Helmet Not Asked    Seat Belt Yes    Self-Exams Not Asked    Parent/sibling w/ CABG, MI or angioplasty before 65F 55M? Not Asked   Social History Narrative    Not on file     Social Determinants of Health     Financial Resource Strain: Low Risk  (1/30/2019)    Overall Financial Resource Strain (CARDIA)     Difficulty of Paying Living Expenses: Not very hard   Food Insecurity: No Food Insecurity (1/30/2019)    Hunger Vital Sign     Worried About Running Out of Food in the Last Year: Never true     Ran Out of Food in the Last Year: Never true   Transportation Needs: No Transportation Needs (1/30/2019)    PRAPARE - Transportation     Lack of Transportation (Medical): No     Lack of Transportation (Non-Medical): No   Physical Activity: Not on file   Stress: Not on file   Social Connections: Not on file   Interpersonal Safety: Not on file   Housing Stability:  Not on file     Past Surgical History:   Procedure Laterality Date    BLADDER SURGERY      1994    CYSTOSCOPY      LASIK BILATERAL Bilateral 2017    ZZC NONSPECIFIC PROCEDURE  1994    urethroscopy       ROS:  E/M: NEGATIVE for ear, nose, mouth and throat problems  R: NEGATIVE for significant/chronic cough or SOB  CV: NEGATIVE for chest pain or palpitations  GI: NEGATIVE for abdominal pain, chronic diarrhea or constipation  :  NEGATIVE for dysuria, hematuria or vaginal discharge. No sexual health concerns.       Current Outpatient Medications   Medication    albuterol (PROAIR HFA/PROVENTIL HFA/VENTOLIN HFA) 108 (90 Base) MCG/ACT inhaler    buPROPion (WELLBUTRIN XL) 150 MG 24 hr tablet    drospirenone-ethinyl estradiol (LINDEN) 3-0.03 MG tablet    fluticasone (FLONASE) 50 MCG/ACT nasal spray    montelukast (SINGULAIR) 10 MG tablet    vitamin C (ASCORBIC ACID) 1000 MG TABS     No current facility-administered medications for this visit.       Patient Active Problem List    Diagnosis Date Noted    Pharyngitis, acute 01/30/2019     Priority: Medium    Asthma, intermittent, uncomplicated 02/08/2017     Priority: Medium    Other seasonal allergic rhinitis 02/08/2017     Priority: Medium    Health Care Home 02/25/2013     Priority: Medium     State Tier Level:  Tier 1  Status:  N/A  Care Coordinator:  N/A    See Letters for H Care Plan          ACP (advance care planning) 02/25/2013     Priority: Medium       Advance Care Planning 2/3/2016: ACP Review of Chart / Resources Provided:  Reviewed chart for advance care plan.  Prudence Avendanokrista has no plan or code status on file. Discussed available resources and provided with information. Confirmed code status reflects current choices pending further ACP discussions.  Confirmed/documented legally designated decision maker(s). Added by Aviva Barragan              Contact dermatitis and other eczema, due to unspecified cause 02/22/2011     Priority: Medium    Obesity  "02/22/2011     Priority: Medium     Problem list name updated by automated process. Provider to review         OBJECTIVE:  /72 (BP Location: Left arm, Patient Position: Sitting, Cuff Size: Adult Large)   Pulse 81   Temp 98  F (36.7  C) (Temporal)   Ht 1.664 m (5' 5.5\")   Wt 97.5 kg (215 lb)   LMP 02/19/2024 (Approximate)   SpO2 98%   BMI 35.23 kg/m      General: 38 year old female who appears her stated age. Vital signs noted  Head: Normocephalic  Eyes: pupils equal round reactive to light and accomodation, extra ocular movements intact  Ears: external canals and TMs free of abnormalities  Nose: patent, without mucosal abnormalities  Mouth and throat: without erythema or lesions of the mucosa  Neck: supple, without adenopathy or thyromegaly  Lungs: clear to auscultation, no wheezing or crackles  Breasts:  Pt declined. No concerns.   Cv: regular rate and rhythm, normal s1 and s2 without murmur or click  Abd: soft, non-tender, no masses, no hepatomegaly or splenomegaly.   (female): Pt declined. No concerns.   Ms: normal muscle tone & symmetry  Skin: clear to inspection and with no palpable abnormalities.  Neuro: sensation and motor function grossly intact; cranial nerves without obvious abnormalities.    ASSESSMENT/PLAN:    Encounter for general medical examination  Karen is doing okay today. Did pend time discussing mental health, and making a plan- see separate note. Will update fasting labs, refill OCP 1 year. Will contact with results when available.     Screening for lipid disorders  - VENOUS COLLECTION  - Lipid Panel (BFP)    Screening for blood disease  - VENOUS COLLECTION  - Hemogram Platelet (BFP)    Screening for metabolic disorder  - VENOUS COLLECTION  - Basic Metabolic Panel (BFP)    Surveillance of contraceptive pill  - drospirenone-ethinyl estradiol (LINDEN) 3-0.03 MG tablet; Take 1 tablet by mouth daily    Asthma, intermittent, uncomplicated  Other seasonal allergic rhinitis  - Asthma " "Action Plan (AAP)  - albuterol (PROAIR HFA/PROVENTIL HFA/VENTOLIN HFA) 108 (90 Base) MCG/ACT inhaler; Inhale 1-2 puffs into the lungs every 4 hours as needed for shortness of breath  - montelukast (SINGULAIR) 10 MG tablet; Take 1 tablet (10 mg) by mouth at bedtime    - montelukast (SINGULAIR) 10 MG tablet; Take 1 tablet (10 mg) by mouth at bedtime    Weight gain  - VENOUS COLLECTION  - TSH WITH FREE T4 REFLEX ([facility])    Mild recurrent major depression (H24)  Grieving  - buPROPion (WELLBUTRIN XL) 150 MG 24 hr tablet; Take 1 tablet (150 mg) by mouth every morning      - buPROPion (WELLBUTRIN XL) 150 MG 24 hr tablet; Take 1 tablet (150 mg) by mouth every morning    Chronic pain of right knee  - Orthopedic  Referral     reports that she has never smoked. She has never been exposed to tobacco smoke. She has never used smokeless tobacco.      Estimated body mass index is 35.23 kg/m  as calculated from the following:    Height as of this encounter: 1.664 m (5' 5.5\").    Weight as of this encounter: 97.5 kg (215 lb).  Weight management plan: Discussed healthy diet and exercise guidelines      Labs pending:      Fasting glucose      Fasting lipids  Meds Suggested:      Vitamin D       Calcium  Tests Recommended:      Regular Dental Examinations        Eye exam        Mammogram yearly  Behavior Modifications:       Cardiovascular exercise 3 times per week--enough to get your Target Heart rate  Other recommendations:     BMI noted and discussed      Regular breast exam     Encouraged My Chart    Counseling Resources:  ATP IV Guidelines  Pooled Cohorts Equation Calculator  Breast Cancer Risk Calculator  FRAX Risk Assessment  ICSI Preventive Guidelines  Dietary Guidelines for Americans, 2010  [employer]'s MyPlate            Alida Reyes PA-C  3/6/2024    "

## 2024-03-06 NOTE — PROGRESS NOTES
"CC: Knee pain, stress eating/weight gain, medication Check    History:  Knee pain:  Fell directly on R kneecap 10 years ago. Did not have medical evaluation at that time. Continues to have anterior and posterior knee pain. More noticeable with stairs, more intense exercise. Feels aching more with weather change. Would like to have this evaluated.     Low mood, Weight gain:  Has been working in [employer] for 2 years now. Generally 4 shifts per week, at least 10 hours per shift. Very sedentary job. Has been busier lately with staffing, coverage, taking on extra shifts. Last week actually worked 5 shifts, 1 was late overnight, and other early early morning. Feels like when she gets done, not motivated/energy to go the gym. Walks dog, but no formal exercise for the past 1-2 months. She is gaining weight that she feels mainly started after her father was in signficant car accident in 2019, and ultimately passing away from ALS in 2021. Sees therapist twice monthly and working through grief. They have told her she has PTSD from the passing of her father. Does feel that she eats due to stress, not necessary binge eating, more so snacking. Not enjoying things as much. Denies any SI/HI. Has never done medication to help with mental health, but would be interested.     Asthma:  Controlled with albuterol inhaler as needed (exercise), as well as montelukast daily.     PMH, MEDICATIONS, ALLERGIES, SOCIAL AND FAMILY HISTORY in [facility] and reviewed by me personally.    ROS negative other than the symptoms noted above in the HPI.    Examination   /72 (BP Location: Left arm, Patient Position: Sitting, Cuff Size: Adult Large)   Pulse 81   Temp 98  F (36.7  C) (Temporal)   Ht 1.664 m (5' 5.5\")   Wt 97.5 kg (215 lb)   LMP 02/19/2024 (Approximate)   SpO2 98%   BMI 35.23 kg/m       Constitutional: Sitting comfortably, in no acute distress. Vital signs noted  Neck:  no adenopathy, trachea midline and normal to palpation, " thyroid normal to palpation  Cardiovascular:  regular rate and rhythm, no murmurs, clicks, or gallops  Respiratory:  normal respiratory rate and rhythm, lungs clear to auscultation  M/S: ROM of knees intact. No tenderness to palpation. No tenderness over joint lines. No enlargement/tenderness in popliteal fossa. No erythema, edema. Posterior/anterior drawer test negative. Lachman negative. Crepitus with patellar mobilization.   SKIN: No jaundice/pallor/rash.   Psychiatric: mentation appears normal and affect normal/bright      A/P    ICD-10-CM    1. Encounter for general medical examination  Z00.00       2. Screening for lipid disorders  Z13.220 VENOUS COLLECTION     Lipid Panel (BFP)      3. Screening for blood disease  Z13.0 VENOUS COLLECTION     Hemogram Platelet (BFP)      4. Screening for metabolic disorder  Z13.228 VENOUS COLLECTION     Basic Metabolic Panel (BFP)      5. Surveillance of contraceptive pill  Z30.41 drospirenone-ethinyl estradiol (LINDEN) 3-0.03 MG tablet      6. Asthma, intermittent, uncomplicated  J45.20 Asthma Action Plan (AAP)     albuterol (PROAIR HFA/PROVENTIL HFA/VENTOLIN HFA) 108 (90 Base) MCG/ACT inhaler     montelukast (SINGULAIR) 10 MG tablet      7. Other seasonal allergic rhinitis  J30.2 montelukast (SINGULAIR) 10 MG tablet      8. Weight gain  R63.5 VENOUS COLLECTION     TSH WITH FREE T4 REFLEX ([facility])      9. Mild recurrent major depression (H24)  F33.0 buPROPion (WELLBUTRIN XL) 150 MG 24 hr tablet      10. Grieving  F43.21 buPROPion (WELLBUTRIN XL) 150 MG 24 hr tablet          DISCUSSION:    Knee pain:  Agreed to refer to orthopedic provider for further evaluation. May have sustained cartilage damage from her fall.     Low mood, Weight gain:  Spent time discussing mental health. Had patient complete PHQ/AVIVA, but scores overall low (8 and 5, respectively). I do feel that patient would benefit from Noom for weight loss especially with stress/mood component to her eating. Overall I  do feel that her symptoms are consistent with mild depression, worsen with the passing of her father. Continue working with therapist. Agreed to start trial of Wellbutrin 150 mg once daily. Take in the morning or night, take with food. Denies and personal or family history of seizures. Monitor for side effects, including sleep changes, worsening depression, weight changes, GI issues, sexual changes, and contact me if noted. Otherwise, filled for 2 months and should schedule an appt at that time to discuss medication, symptoms. Minimize alcohol while taking.  Contact me with any concerns/worsening. Proceed to ER with any significant worsening.     Asthma:  Updated ACT/AAP. Would refill for 1 year.     follow up visit: 2 months    Alida Reyes PA-C  [facility]

## 2024-03-06 NOTE — NURSING NOTE
"Chief Complaint   Patient presents with    Physical     Fasting cpx, pap is up to date    Referral     Referral for right knee pain    Weight Problem     Has gained weight over the past year and feels that she \"stress eats\"    Recheck Medication     Refill asthma meds and birth control         Pre-visit Screening:  Immunizations:  up to date  Colonoscopy:  NA  Mammogram: NA  Asthma Action Test/Plan:  Yes  PHQ9:  NA phq2 done today  GAD7:  NA  Questioned patient about current smoking habits Pt. has never smoked.  Ok to leave detailed message on voice mail for today's visit only Yes, phone # [phone]      "

## 2024-03-06 NOTE — LETTER
My Asthma Action Plan    Name: Prudence Feliz   YOB: 1985  Date: 3/6/2024   My doctor: Alida Reyes PA-C   My clinic: [facility]        My Rescue Medicine:   Albuterol inhaler (Proair/Ventolin/Proventil HFA)  2-4 puffs EVERY 4 HOURS as needed. Use a spacer if recommended by your provider.   My Asthma Severity:   Intermittent / Exercise Induced  Know your asthma triggers: exercise or sports             GREEN ZONE   Good Control  I feel good  No cough or wheeze  Can work, sleep and play without asthma symptoms       Take your asthma control medicine every day.     If exercise triggers your asthma, take your rescue medication  15 minutes before exercise or sports, and  During exercise if you have asthma symptoms  Spacer to use with inhaler: If you have a spacer, make sure to use it with your inhaler             YELLOW ZONE Getting Worse  I have ANY of these:  I do not feel good  Cough or wheeze  Chest feels tight  Wake up at night   Keep taking your Green Zone medications  Start taking your rescue medicine:  every 20 minutes for up to 1 hour. Then every 4 hours for 24-48 hours.  If you stay in the Yellow Zone for more than 12-24 hours, contact your doctor.  If you do not return to the Green Zone in 12-24 hours or you get worse, start taking your oral steroid medicine if prescribed by your provider.           RED ZONE Medical Alert - Get Help  I have ANY of these:  I feel awful  Medicine is not helping  Breathing getting harder  Trouble walking or talking  Nose opens wide to breathe       Take your rescue medicine NOW  If your provider has prescribed an oral steroid medicine, start taking it NOW  Call your doctor NOW  If you are still in the Red Zone after 20 minutes and you have not reached your doctor:  Take your rescue medicine again and  Call 911 or go to the emergency room right away    See your regular doctor within 2 weeks of an Emergency Room or Urgent Care  visit for follow-up treatment.          Annual Reminders:  Meet with Asthma Educator,  Flu Shot in the Fall, consider Pneumonia Vaccination for patients with asthma (aged 19 and older).    Pharmacy:    [id number] - [place], [place] - [address] AT [place] [address] [address]    Electronically signed by Alida Reyes PA-C   Date: 03/06/24                    Asthma Triggers  How To Control Things That Make Your Asthma Worse    Triggers are things that make your asthma worse.  Look at the list below to help you find your triggers and   what you can do about them. You can help prevent asthma flare-ups by staying away from your triggers.      Trigger                                                          What you can do   Cigarette Smoke  Tobacco smoke can make asthma worse. Do not allow smoking in your home, car or around you.  Be sure no one smokes at a child s day care or school.  If you smoke, ask your health care provider for ways to help you quit.  Ask family members to quit too.  Ask your health care provider for a referral to Quit Plan to help you quit smoking, or call 5-220-212-PLAN.     Colds, Flu, Bronchitis  These are common triggers of asthma. Wash your hands often.  Don t touch your eyes, nose or mouth.  Get a flu shot every year.     Dust Mites  These are tiny bugs that live in cloth or carpet. They are too small to see. Wash sheets and blankets in hot water every week.   Encase pillows and mattress in dust mite proof covers.  Avoid having carpet if you can. If you have carpet, vacuum weekly.   Use a dust mask and HEPA vacuum.   Pollen and Outdoor Mold  Some people are allergic to trees, grass, or weed pollen, or molds. Try to keep your windows closed.  Limit time out doors when pollen count is high.   Ask you health care provider about taking medicine during allergy season.     Animal Dander  Some people are allergic to skin flakes, urine or saliva  from pets with fur or feathers. Keep pets with fur or feathers out of your home.    If you can t keep the pet outdoors, then keep the pet out of your bedroom.  Keep the bedroom door closed.  Keep pets off cloth furniture and away from stuffed toys.     Mice, Rats, and Cockroaches  Some people are allergic to the waste from these pests.   Cover food and garbage.  Clean up spills and food crumbs.  Store grease in the refrigerator.   Keep food out of the bedroom.   Indoor Mold  This can be a trigger if your home has high moisture. Fix leaking faucets, pipes, or other sources of water.   Clean moldy surfaces.  Dehumidify basement if it is damp and smelly.   Smoke, Strong Odors, and Sprays  These can reduce air quality. Stay away from strong odors and sprays, such as perfume, powder, hair spray, paints, smoke incense, paint, cleaning products, candles and new carpet.   Exercise or Sports  Some people with asthma have this trigger. Be active!  Ask your doctor about taking medicine before sports or exercise to prevent symptoms.    Warm up for 5-10 minutes before and after sports or exercise.     Other Triggers of Asthma  Cold air:  Cover your nose and mouth with a scarf.  Sometimes laughing or crying can be a trigger.  Some medicines and food can trigger asthma.

## 2024-03-07 LAB — TSH SERPL-ACNC: 1.24 MIU/L

## 2024-04-02 DIAGNOSIS — Z30.41 SURVEILLANCE OF CONTRACEPTIVE PILL: ICD-10-CM

## 2024-04-02 RX ORDER — DROSPIRENONE AND ETHINYL ESTRADIOL 0.03MG-3MG
1 KIT ORAL DAILY
COMMUNITY
Start: 2024-04-02

## 2024-04-02 NOTE — TELEPHONE ENCOUNTER
Prudence Feliz is requesting a refill of:    Refused Prescriptions:                       Disp   Refills    drospirenone-ethinyl estradiol (DAVID) 3-*                Sig: TAKE 1 TABLET BY MOUTH DAILY  Refused By: BRENT DELVALLE  Reason for Refusal: Duplicate

## 2024-04-26 DIAGNOSIS — F43.21 GRIEVING: ICD-10-CM

## 2024-04-26 DIAGNOSIS — F33.0 MILD RECURRENT MAJOR DEPRESSION (H): ICD-10-CM

## 2024-04-26 RX ORDER — BUPROPION HYDROCHLORIDE 150 MG/1
150 TABLET ORAL EVERY MORNING
Qty: 14 TABLET | Refills: 0 | Status: SHIPPED | OUTPATIENT
Start: 2024-04-26 | End: 2024-05-13

## 2024-04-26 NOTE — TELEPHONE ENCOUNTER
Prudence Feliz is requesting a refill of:    Pending Prescriptions:                       Disp   Refills    buPROPion (WELLBUTRIN XL) 150 MG 24 hr ta*14 tab*0            Sig: TAKE 1 TABLET(150 MG) BY MOUTH EVERY MORNING    Pt has OV on 5/13/24 for refills

## 2024-05-13 ENCOUNTER — OFFICE VISIT (OUTPATIENT)
Dept: FAMILY MEDICINE | Facility: CLINIC | Age: 39
End: 2024-05-13

## 2024-05-13 VITALS
DIASTOLIC BLOOD PRESSURE: 74 MMHG | SYSTOLIC BLOOD PRESSURE: 114 MMHG | TEMPERATURE: 97.9 F | WEIGHT: 213 LBS | HEART RATE: 96 BPM | OXYGEN SATURATION: 98 % | BODY MASS INDEX: 34.91 KG/M2

## 2024-05-13 DIAGNOSIS — F43.21 GRIEVING: ICD-10-CM

## 2024-05-13 DIAGNOSIS — F33.0 MILD RECURRENT MAJOR DEPRESSION (H): Primary | ICD-10-CM

## 2024-05-13 PROCEDURE — 99213 OFFICE O/P EST LOW 20 MIN: CPT

## 2024-05-13 RX ORDER — BUPROPION HYDROCHLORIDE 150 MG/1
150 TABLET ORAL EVERY MORNING
Qty: 90 TABLET | Refills: 3 | Status: SHIPPED | OUTPATIENT
Start: 2024-05-13

## 2024-05-13 ASSESSMENT — ANXIETY QUESTIONNAIRES
1. FEELING NERVOUS, ANXIOUS, OR ON EDGE: NOT AT ALL
2. NOT BEING ABLE TO STOP OR CONTROL WORRYING: SEVERAL DAYS
6. BECOMING EASILY ANNOYED OR IRRITABLE: MORE THAN HALF THE DAYS
GAD7 TOTAL SCORE: 5
IF YOU CHECKED OFF ANY PROBLEMS ON THIS QUESTIONNAIRE, HOW DIFFICULT HAVE THESE PROBLEMS MADE IT FOR YOU TO DO YOUR WORK, TAKE CARE OF THINGS AT HOME, OR GET ALONG WITH OTHER PEOPLE: NOT DIFFICULT AT ALL
3. WORRYING TOO MUCH ABOUT DIFFERENT THINGS: NOT AT ALL
GAD7 TOTAL SCORE: 5
7. FEELING AFRAID AS IF SOMETHING AWFUL MIGHT HAPPEN: NOT AT ALL
5. BEING SO RESTLESS THAT IT IS HARD TO SIT STILL: NOT AT ALL

## 2024-05-13 ASSESSMENT — PATIENT HEALTH QUESTIONNAIRE - PHQ9
SUM OF ALL RESPONSES TO PHQ QUESTIONS 1-9: 8
5. POOR APPETITE OR OVEREATING: MORE THAN HALF THE DAYS

## 2024-05-13 NOTE — NURSING NOTE
Chief Complaint   Patient presents with    Recheck Medication     Recheck on Bupropion, she has been on for the last 2 months, going well so far     Pre-visit Screening:  Immunizations:  up to date  Colonoscopy:  NA  Mammogram: NA  Asthma Action Test/Plan:  NA  PHQ9:  Done today  GAD7:  Done today  Questioned patient about current smoking habits Pt. has never smoked.  Ok to leave detailed message on voice mail for today's visit only Yes, phone # [phone]

## 2024-05-13 NOTE — PROGRESS NOTES
Assessment & Plan     1. Mild recurrent major depression (H24)  - Symptoms have significantly improved according to Karen. Offered option of increasing dose of Wellbutrin to 300 mg daily however she notes she would like to stay at current dose of 150 mg. Refills sent in for one year. She will continue with therapy and also follow up with us if her symptoms worsen. Return to clinic and ER precautions discussed.   - buPROPion (WELLBUTRIN XL) 150 MG 24 hr tablet; Take 1 tablet (150 mg) by mouth every morning  Dispense: 90 tablet; Refill: 3    2. Grieving  - Continue with therapy.   - buPROPion (WELLBUTRIN XL) 150 MG 24 hr tablet; Take 1 tablet (150 mg) by mouth every morning  Dispense: 90 tablet; Refill: 3    Follow up in one year or sooner if problems or concerns. Reasons to follow-up sooner or seek emergent care reviewed.     Isabell Kauffman PA-C  [facility]       Subjective     Prudence Feliz is a 39 year old female who presents to clinic today for the following health issues:    HPI   Chief Complaint   Patient presents with    Recheck Medication     Recheck on Bupropion, she has been on for the last 2 months, going well so far      Karen presents for a medication recheck. She was started on Bupropion 150 mg daily on 03/06/24 for mild depression, please see that note for more details and information. Karen states since then has felt more motivated to get tasks completed and that the medication is helping with her depression. She notes she typically takes the medication every morning. She denies any side effects of the medication; no insomnia, headaches, sexual side effects, or any abdominal pain, nausea/vomiting, etc. She does see her therapist once every two weeks as she is struggling with the passing of her father a few years ago. She also notes her mother's health is declining and she recently moved in with her mother to take care of her which she notes has also been difficult. She  denies any SI/HI and overall feels she is doing better and would like to stay at this dose.     Daily medications reviewed.       Objective    /74 (BP Location: Right arm, Patient Position: Sitting, Cuff Size: Adult Large)   Pulse 96   Temp 97.9  F (36.6  C) (Temporal)   Wt 96.6 kg (213 lb)   SpO2 98%   BMI 34.91 kg/m    Body mass index is 34.91 kg/m .    Physical Examination:  GENERAL: healthy, alert and no distress  EYES: Eyes grossly normal to inspection, PERRL and conjunctivae and sclerae normal  HENT: mouth without ulcers or lesions  NECK: no adenopathy, no asymmetry, masses, or scars and thyroid normal to palpation  RESP: lungs clear to auscultation - no rales, rhonchi or wheezes  CV: regular rate and rhythm, normal S1 S2, no S3 or S4, no murmur, click or rub, no peripheral edema   ABDOMEN: soft and non-tender  MS: no gross musculoskeletal defects noted, no edema  SKIN: no suspicious lesions or rashes  PSYCH: mentation appears normal, affect normal/bright    Labs reviewed.        2/14/2020     9:37 AM 3/6/2024    11:48 AM 5/13/2024    11:05 AM   PHQ   PHQ-9 Total Score 4 8 8   Q9: Thoughts of better off dead/self-harm past 2 weeks Not at all Not at all Not at all          3/6/2024    11:48 AM 5/13/2024    11:05 AM   AVIVA-7 SCORE   Total Score 5 5

## 2025-03-07 DIAGNOSIS — Z30.41 SURVEILLANCE OF CONTRACEPTIVE PILL: ICD-10-CM

## 2025-03-10 RX ORDER — DROSPIRENONE AND ETHINYL ESTRADIOL 0.03MG-3MG
1 KIT ORAL DAILY
COMMUNITY
Start: 2025-03-10

## 2025-03-10 NOTE — TELEPHONE ENCOUNTER
Refused Prescriptions:                       Disp   Refills    drospirenone-ethinyl estradiol (LINDEN) 3-0*                Sig: TAKE 1 TABLET BY MOUTH DAILY  Refused By: ELIANA FLOWERS  Reason for Refusal: Request already responded to by other means (phone, fax, etc.)    Refilled on 3-7-25 for one year  Eliana

## 2025-06-01 DIAGNOSIS — F43.21 GRIEVING: ICD-10-CM

## 2025-06-01 DIAGNOSIS — F33.0 MILD RECURRENT MAJOR DEPRESSION: ICD-10-CM

## 2025-06-03 RX ORDER — BUPROPION HYDROCHLORIDE 150 MG/1
150 TABLET ORAL EVERY MORNING
COMMUNITY
Start: 2025-06-03

## 2025-06-03 RX ORDER — FLUOXETINE 10 MG/1
10 CAPSULE ORAL DAILY
COMMUNITY
Start: 2025-06-03

## 2025-06-03 NOTE — TELEPHONE ENCOUNTER
Prudence Feliz is requesting a refill of:    Refused Prescriptions:                       Disp   Refills    FLUoxetine (PROZAC) 10 MG capsule [Pharmac*                Sig: TAKE 1 CAPSULE(10 MG) BY MOUTH DAILY  Refused By: BRENT DELVALLE  Reason for Refusal: Patient needs appointment    buPROPion (WELLBUTRIN XL) 150 MG 24 hr tab*                Sig: TAKE 1 TABLET(150 MG) BY MOUTH EVERY MORNING  Refused By: BRENT DELVALLE  Reason for Refusal: Patient needs appointment    Pt has OV 6/6/25